# Patient Record
Sex: FEMALE | Race: BLACK OR AFRICAN AMERICAN | NOT HISPANIC OR LATINO | Employment: FULL TIME | URBAN - METROPOLITAN AREA
[De-identification: names, ages, dates, MRNs, and addresses within clinical notes are randomized per-mention and may not be internally consistent; named-entity substitution may affect disease eponyms.]

---

## 2018-10-05 ENCOUNTER — TELEPHONE (OUTPATIENT)
Dept: INTERNAL MEDICINE | Facility: CLINIC | Age: 63
End: 2018-10-05

## 2018-10-05 NOTE — TELEPHONE ENCOUNTER
----- Message from Zeina Craig sent at 10/5/2018  3:05 PM CDT -----  Contact: [phone]  Patient requesting an call to discuss setting  an new patient appointment in the month of November , stated she just left MD office . Please call and advise, Thanks

## 2018-11-15 ENCOUNTER — IMMUNIZATION (OUTPATIENT)
Dept: INTERNAL MEDICINE | Facility: CLINIC | Age: 63
End: 2018-11-15
Payer: MEDICARE

## 2018-11-15 ENCOUNTER — OFFICE VISIT (OUTPATIENT)
Dept: INTERNAL MEDICINE | Facility: CLINIC | Age: 63
End: 2018-11-15
Payer: MEDICARE

## 2018-11-15 VITALS
DIASTOLIC BLOOD PRESSURE: 91 MMHG | SYSTOLIC BLOOD PRESSURE: 139 MMHG | HEART RATE: 70 BPM | OXYGEN SATURATION: 99 % | HEIGHT: 64 IN | WEIGHT: 205 LBS | BODY MASS INDEX: 35 KG/M2

## 2018-11-15 DIAGNOSIS — D24.1 BENIGN NEOPLASM OF RIGHT BREAST: ICD-10-CM

## 2018-11-15 DIAGNOSIS — Z12.31 SCREENING MAMMOGRAM, ENCOUNTER FOR: ICD-10-CM

## 2018-11-15 DIAGNOSIS — I10 ESSENTIAL HYPERTENSION: Primary | ICD-10-CM

## 2018-11-15 DIAGNOSIS — R51.9 NONINTRACTABLE HEADACHE, UNSPECIFIED CHRONICITY PATTERN, UNSPECIFIED HEADACHE TYPE: ICD-10-CM

## 2018-11-15 DIAGNOSIS — E78.5 HYPERLIPIDEMIA, UNSPECIFIED HYPERLIPIDEMIA TYPE: ICD-10-CM

## 2018-11-15 DIAGNOSIS — Z20.2 SYPHILIS CONTACT, TREATED: ICD-10-CM

## 2018-11-15 DIAGNOSIS — N63.0 BREAST MASS: ICD-10-CM

## 2018-11-15 DIAGNOSIS — E55.9 VITAMIN D DEFICIENCY: ICD-10-CM

## 2018-11-15 DIAGNOSIS — R35.0 URINARY FREQUENCY: ICD-10-CM

## 2018-11-15 PROCEDURE — 3008F BODY MASS INDEX DOCD: CPT | Mod: CPTII,S$GLB,, | Performed by: INTERNAL MEDICINE

## 2018-11-15 PROCEDURE — 1101F PT FALLS ASSESS-DOCD LE1/YR: CPT | Mod: CPTII,S$GLB,, | Performed by: INTERNAL MEDICINE

## 2018-11-15 PROCEDURE — 82043 UR ALBUMIN QUANTITATIVE: CPT

## 2018-11-15 PROCEDURE — 90662 IIV NO PRSV INCREASED AG IM: CPT | Mod: PBBFAC,PO

## 2018-11-15 PROCEDURE — 99214 OFFICE O/P EST MOD 30 MIN: CPT | Mod: S$GLB,,, | Performed by: INTERNAL MEDICINE

## 2018-11-15 PROCEDURE — 81001 URINALYSIS AUTO W/SCOPE: CPT

## 2018-11-15 PROCEDURE — 99499 UNLISTED E&M SERVICE: CPT | Mod: S$GLB,,, | Performed by: INTERNAL MEDICINE

## 2018-11-15 PROCEDURE — 99999 PR PBB SHADOW E&M-EST. PATIENT-LVL V: CPT | Mod: PBBFAC,,, | Performed by: INTERNAL MEDICINE

## 2018-11-15 RX ORDER — METFORMIN HYDROCHLORIDE 1000 MG/1
TABLET ORAL
Refills: 4 | COMMUNITY
Start: 2018-09-04 | End: 2018-12-13 | Stop reason: SDUPTHER

## 2018-11-15 RX ORDER — INSULIN GLARGINE 300 U/ML
INJECTION, SOLUTION SUBCUTANEOUS
Qty: 5 SYRINGE | Refills: 3 | Status: ON HOLD | OUTPATIENT
Start: 2018-11-15 | End: 2023-06-05 | Stop reason: CLARIF

## 2018-11-15 RX ORDER — GABAPENTIN 300 MG/1
CAPSULE ORAL
Qty: 90 CAPSULE | Refills: 6 | Status: SHIPPED | OUTPATIENT
Start: 2018-11-15 | End: 2019-10-22 | Stop reason: SDUPTHER

## 2018-11-15 RX ORDER — PRAVASTATIN SODIUM 40 MG/1
TABLET ORAL
Refills: 3 | COMMUNITY
Start: 2018-09-04 | End: 2018-12-13 | Stop reason: SDUPTHER

## 2018-11-15 RX ORDER — LEVOTHYROXINE SODIUM 100 UG/1
TABLET ORAL
Refills: 3 | COMMUNITY
Start: 2018-09-04 | End: 2018-11-15

## 2018-11-15 RX ORDER — LEVOTHYROXINE SODIUM 125 UG/1
TABLET ORAL
Qty: 90 TABLET | Refills: 3 | Status: SHIPPED | OUTPATIENT
Start: 2018-11-15 | End: 2019-03-01

## 2018-11-15 RX ORDER — LEVOTHYROXINE SODIUM 125 UG/1
TABLET ORAL
Refills: 5 | COMMUNITY
Start: 2018-09-11 | End: 2018-11-15 | Stop reason: SDUPTHER

## 2018-11-15 RX ORDER — INSULIN GLARGINE 300 U/ML
INJECTION, SOLUTION SUBCUTANEOUS
Refills: 3 | COMMUNITY
Start: 2018-09-11 | End: 2018-11-15 | Stop reason: SDUPTHER

## 2018-11-15 RX ORDER — HYDROCHLOROTHIAZIDE 25 MG/1
TABLET ORAL
Refills: 0 | COMMUNITY
Start: 2018-09-04 | End: 2018-12-13 | Stop reason: SDUPTHER

## 2018-11-15 RX ORDER — NAPROXEN SODIUM 220 MG/1
TABLET, FILM COATED ORAL
Refills: 2 | COMMUNITY
Start: 2018-09-04 | End: 2018-12-13 | Stop reason: SDUPTHER

## 2018-11-15 RX ORDER — AMLODIPINE BESYLATE 10 MG/1
TABLET ORAL
Refills: 3 | COMMUNITY
Start: 2018-09-04 | End: 2018-12-13 | Stop reason: SDUPTHER

## 2018-11-15 RX ORDER — INSULIN PUMP SYRINGE, 3 ML
EACH MISCELLANEOUS
Qty: 1 EACH | Refills: 0 | Status: SHIPPED | OUTPATIENT
Start: 2018-11-15 | End: 2018-12-03 | Stop reason: SDUPTHER

## 2018-11-16 LAB
ALBUMIN/CREAT UR: 76.6 UG/MG
BACTERIA #/AREA URNS AUTO: NORMAL /HPF
BILIRUB UR QL STRIP: NEGATIVE
CLARITY UR REFRACT.AUTO: CLEAR
COLOR UR AUTO: YELLOW
CREAT UR-MCNC: 145 MG/DL
GLUCOSE UR QL STRIP: ABNORMAL
HGB UR QL STRIP: ABNORMAL
HYALINE CASTS UR QL AUTO: 1 /LPF
KETONES UR QL STRIP: NEGATIVE
LEUKOCYTE ESTERASE UR QL STRIP: NEGATIVE
MICROALBUMIN UR DL<=1MG/L-MCNC: 111 UG/ML
MICROSCOPIC COMMENT: NORMAL
NITRITE UR QL STRIP: NEGATIVE
PH UR STRIP: 5 [PH] (ref 5–8)
PROT UR QL STRIP: NEGATIVE
RBC #/AREA URNS AUTO: 2 /HPF (ref 0–4)
SP GR UR STRIP: 1.03 (ref 1–1.03)
URN SPEC COLLECT METH UR: ABNORMAL
WBC #/AREA URNS AUTO: 0 /HPF (ref 0–5)
YEAST UR QL AUTO: NORMAL

## 2018-11-17 ENCOUNTER — HOSPITAL ENCOUNTER (OUTPATIENT)
Dept: RADIOLOGY | Facility: HOSPITAL | Age: 63
Discharge: HOME | End: 2018-11-17
Attending: INTERNAL MEDICINE
Payer: MEDICARE

## 2018-11-17 DIAGNOSIS — R51.9 NONINTRACTABLE HEADACHE, UNSPECIFIED CHRONICITY PATTERN, UNSPECIFIED HEADACHE TYPE: ICD-10-CM

## 2018-11-17 PROCEDURE — 70450 CT HEAD/BRAIN W/O DYE: CPT | Mod: 26,,, | Performed by: RADIOLOGY

## 2018-11-17 PROCEDURE — 70450 CT HEAD/BRAIN W/O DYE: CPT | Mod: TC

## 2018-11-19 NOTE — PROGRESS NOTES
Subjective:       Patient ID: Marco Hagen is a 63 y.o. female.    Chief Complaint: Establish Care    HPIPt is new to me with HTN, DM, HLP.  SHe is having some HA.  Denies any CP or SOB.  Has not been taking her insulin - blood sugar today 298.  Review of Systems   Respiratory: Negative for shortness of breath (PND or orthopnea).    Cardiovascular: Negative for chest pain (arm pain or jaw pain).   Gastrointestinal: Negative for abdominal pain, diarrhea, nausea and vomiting.   Genitourinary: Negative for dysuria.   Neurological: Positive for headaches. Negative for seizures and syncope.       Objective:      Physical Exam   Constitutional: She is oriented to person, place, and time. She appears well-developed and well-nourished. No distress.   HENT:   Head: Normocephalic.   Mouth/Throat: Oropharynx is clear and moist.   Eyes: EOM are normal. Pupils are equal, round, and reactive to light.   Neck: Neck supple. No JVD present. No thyromegaly present.   Cardiovascular: Normal rate, regular rhythm, normal heart sounds and intact distal pulses. Exam reveals no gallop and no friction rub.   No murmur heard.  Pulmonary/Chest: Effort normal and breath sounds normal. She has no wheezes. She has no rales.   R breast - approx 1cm slightly tender breast mass at 6 o'clock   Abdominal: Soft. Bowel sounds are normal. She exhibits no distension and no mass. There is no tenderness. There is no rebound and no guarding.   Musculoskeletal: She exhibits no edema.   Lymphadenopathy:     She has no cervical adenopathy.   Neurological: She is alert and oriented to person, place, and time. She has normal reflexes.   Skin: Skin is warm and dry.   Psychiatric: She has a normal mood and affect. Her behavior is normal. Judgment and thought content normal.       Assessment:       1. Essential hypertension    2. Hyperlipidemia, unspecified hyperlipidemia type    3. Nonintractable headache, unspecified chronicity pattern, unspecified headache type     4. Diabetes mellitus with neurological manifestations, uncontrolled    5. Vitamin D deficiency    6. Screening mammogram, encounter for    7. Urinary frequency    8. Syphilis contact, treated    9. Breast mass    10. Benign neoplasm of right breast         Plan:   Essential hypertension  -     CBC auto differential; Future; Expected date: 11/15/2018  -     Comprehensive metabolic panel; Future; Expected date: 11/15/2018  -     TSH; Future; Expected date: 11/15/2018  Questionable control - check lab  Hyperlipidemia, unspecified hyperlipidemia type  -     Lipid panel; Future; Expected date: 11/15/2018    Nonintractable headache, unspecified chronicity pattern, unspecified headache type  -     CT Head Without Contrast; Future; Expected date: 11/15/2018    Diabetes mellitus with neurological manifestations, uncontrolled  -     Hemoglobin A1c; Future; Expected date: 11/15/2018  -     Urinalysis  -     Microalbumin/creatinine urine ratio  -     Ambulatory consult to Optometry    Vitamin D deficiency  -     Vitamin D; Future; Expected date: 11/15/2018    Urinary frequency  -     Urine culture    Syphilis contact, treated  -     RPR; Future; Expected date: 11/15/2018 -   with tertiary syphilis    Breast mass  -     Mammo Digital Diagnostic Bilat w/ Scott; Future; Expected date: 11/15/2018  -     US Breast Right Limited; Future; Expected date: 11/15/2018    Benign neoplasm of right breast   -     Mammo Digital Diagnostic Bilat w/ Scott; Future; Expected date: 11/15/2018    Other orders  -     blood-glucose meter kit; Check glucose daily E11.9 meter covered by insurance, check glucose twice daily  Dispense: 1 each; Refill: 0  -     blood sugar diagnostic Strp; Strips and lancets covered by insurance E11.9, check glucose twice daily  Dispense: 100 each; Refill: 6  -     gabapentin (NEURONTIN) 300 MG capsule; Three at bedtime  Dispense: 90 capsule; Refill: 6  -     levothyroxine (SYNTHROID) 125 MCG tablet; TK 1 T PO  IN THE MORNING OES AT LEAST 30 MIN TO AN HOUR B EATING FOOD  Dispense: 90 tablet; Refill: 3  -     TOUJEO SOLOSTAR U-300 INSULIN 300 unit/mL (1.5 mL) InPn pen; INJ 15 UNITS SC    D  Dispense: 5 Syringe; Refill: 3  -     Urinalysis Microscopic  Get records.

## 2018-11-23 ENCOUNTER — HOSPITAL ENCOUNTER (OUTPATIENT)
Dept: RADIOLOGY | Facility: HOSPITAL | Age: 63
Discharge: HOME | End: 2018-11-23
Attending: INTERNAL MEDICINE
Payer: MEDICARE

## 2018-11-23 ENCOUNTER — TELEPHONE (OUTPATIENT)
Dept: INTERNAL MEDICINE | Facility: CLINIC | Age: 63
End: 2018-11-23

## 2018-11-23 VITALS — HEIGHT: 64 IN | WEIGHT: 205 LBS | BODY MASS INDEX: 35 KG/M2

## 2018-11-23 DIAGNOSIS — N63.0 BREAST MASS: ICD-10-CM

## 2018-11-23 DIAGNOSIS — D24.1 BENIGN NEOPLASM OF RIGHT BREAST: ICD-10-CM

## 2018-11-23 PROCEDURE — 76642 ULTRASOUND BREAST LIMITED: CPT | Mod: 26,RT,, | Performed by: RADIOLOGY

## 2018-11-23 PROCEDURE — 77066 DX MAMMO INCL CAD BI: CPT | Mod: 26,,, | Performed by: RADIOLOGY

## 2018-11-23 PROCEDURE — 77062 BREAST TOMOSYNTHESIS BI: CPT | Mod: 26,,, | Performed by: RADIOLOGY

## 2018-11-23 PROCEDURE — 76642 ULTRASOUND BREAST LIMITED: CPT | Mod: TC,PO,RT

## 2018-11-23 PROCEDURE — 77066 DX MAMMO INCL CAD BI: CPT | Mod: TC,PO

## 2018-11-23 NOTE — TELEPHONE ENCOUNTER
Spoke with pharmacy([facility]), will fax script for supplies----- Message from Rebeca Montero sent at 11/21/2018  5:05 PM CST -----  Contact: [facility] [place] [phone]      ----- Message -----  From: Julisa Willingham  Sent: 11/21/2018   3:51 PM  To: Kareem Senior is requesting a prescription for patients diabetic supplies to be faxed to [facility] instead of  pharmacy.    [phone]

## 2018-11-23 NOTE — TELEPHONE ENCOUNTER
Pt has prescription for new meter sent to [facility] please resent to [facility] on file

## 2018-11-29 NOTE — PROGRESS NOTES
Patient, Marco Hagen (MRN #[id number]), presented with a recorded BMI of 35.19 kg/m^2 and a documented comorbidity(s):  - Hypertension  - Hyperlipidemia  to which the severe obesity is a contributing factor. This is consistent with the definition of severe obesity (BMI 35.0-39.9) with comorbidity (ICD-10 E66.01, Z68.35). The patient's severe obesity was monitored, evaluated, addressed and/or treated. This addendum to the medical record is made on 11/29/2018.

## 2018-12-03 RX ORDER — INSULIN PUMP SYRINGE, 3 ML
EACH MISCELLANEOUS
Qty: 1 EACH | Refills: 0 | Status: SHIPPED | OUTPATIENT
Start: 2018-12-03

## 2018-12-03 RX ORDER — LANCETS
EACH MISCELLANEOUS
Qty: 100 EACH | Refills: 6 | Status: SHIPPED | OUTPATIENT
Start: 2018-12-03 | End: 2019-03-12 | Stop reason: SDUPTHER

## 2018-12-03 NOTE — TELEPHONE ENCOUNTER
----- Message from Cecy Rothman sent at 12/3/2018  1:18 PM CST -----  Contact: Raheel[employer]/[phone]  Diabetic or Medical Supplies.  What supplies are needed: lancets, strips, glucometer   What is the brand name of the supplies: true Metrix   Is this a refill or new prescription:  New  Who prescribed the supplies:    Pharmacy or company name, phone # and location:  Script will be sent to [facility]  Comments:  Clinical Notes can be faxed to  [phone]

## 2018-12-10 NOTE — TELEPHONE ENCOUNTER
"----- Message from Amanda Wilder sent at 12/10/2018  3:17 PM CST -----  Contact: self/[phone]  RX request - refill or new RX.  Is this a refill or new RX:    RX name and strength: metrix test stripts  Directions:   Is this a 30 day or 90 day RX:    Local pharmacy or mail order pharmacy:  Local pharmacy  Pharmacy name and phone # (DON'T enter "on file" or "in chart"): [facility] [address] [place] [address] SARABJIT RIZVI AT [facility] KAYA RIZVI [phone] (Phone)  [phone] (Fax)      Comments:          "

## 2018-12-10 NOTE — TELEPHONE ENCOUNTER
----- Message from Ruby Soto sent at 12/10/2018  4:38 PM CST -----  Contact: Patient [phone]  Patient is calling about a refill for test strips. Order was placed to go to [facility] but the Rx was printed instead. [facility] [address] [place] [address] SARABJIT RIZVI AT [facility] MORE WH[phone] (Phone  [phone] (Fax)      Patient is requesting a call back    Please call and advise  Thank you

## 2018-12-13 ENCOUNTER — LAB VISIT (OUTPATIENT)
Dept: LAB | Facility: HOSPITAL | Age: 63
End: 2018-12-13
Attending: INTERNAL MEDICINE
Payer: MEDICARE

## 2018-12-13 ENCOUNTER — OFFICE VISIT (OUTPATIENT)
Dept: INTERNAL MEDICINE | Facility: CLINIC | Age: 63
End: 2018-12-13
Payer: MEDICARE

## 2018-12-13 VITALS
WEIGHT: 205 LBS | HEART RATE: 81 BPM | OXYGEN SATURATION: 97 % | BODY MASS INDEX: 35 KG/M2 | SYSTOLIC BLOOD PRESSURE: 134 MMHG | HEIGHT: 64 IN | TEMPERATURE: 98 F | DIASTOLIC BLOOD PRESSURE: 84 MMHG

## 2018-12-13 DIAGNOSIS — E03.9 HYPOTHYROIDISM, UNSPECIFIED TYPE: ICD-10-CM

## 2018-12-13 DIAGNOSIS — I10 ESSENTIAL HYPERTENSION: Primary | ICD-10-CM

## 2018-12-13 DIAGNOSIS — M89.9 DISORDER OF BONE: ICD-10-CM

## 2018-12-13 DIAGNOSIS — E83.52 HYPERCALCEMIA: ICD-10-CM

## 2018-12-13 DIAGNOSIS — I10 ESSENTIAL HYPERTENSION: ICD-10-CM

## 2018-12-13 LAB
ALBUMIN SERPL BCP-MCNC: 4.1 G/DL
ALP SERPL-CCNC: 94 U/L
ALT SERPL W/O P-5'-P-CCNC: 22 U/L
ANION GAP SERPL CALC-SCNC: 10 MMOL/L
AST SERPL-CCNC: 25 U/L
BILIRUB SERPL-MCNC: 0.4 MG/DL
BUN SERPL-MCNC: 25 MG/DL
CALCIUM SERPL-MCNC: 11.7 MG/DL
CHLORIDE SERPL-SCNC: 102 MMOL/L
CO2 SERPL-SCNC: 30 MMOL/L
CREAT SERPL-MCNC: 1.4 MG/DL
EST. GFR  (AFRICAN AMERICAN): 45.5 ML/MIN/1.73 M^2
EST. GFR  (NON AFRICAN AMERICAN): 39.5 ML/MIN/1.73 M^2
GLUCOSE SERPL-MCNC: 126 MG/DL
POTASSIUM SERPL-SCNC: 4 MMOL/L
PROT SERPL-MCNC: 8.6 G/DL
PTH-INTACT SERPL-MCNC: 105 PG/ML
SODIUM SERPL-SCNC: 142 MMOL/L
T4 FREE SERPL-MCNC: 1.03 NG/DL
TSH SERPL DL<=0.005 MIU/L-ACNC: 20.17 UIU/ML

## 2018-12-13 PROCEDURE — 99214 OFFICE O/P EST MOD 30 MIN: CPT | Mod: S$GLB,,, | Performed by: INTERNAL MEDICINE

## 2018-12-13 PROCEDURE — 1101F PT FALLS ASSESS-DOCD LE1/YR: CPT | Mod: CPTII,S$GLB,, | Performed by: INTERNAL MEDICINE

## 2018-12-13 PROCEDURE — 84443 ASSAY THYROID STIM HORMONE: CPT

## 2018-12-13 PROCEDURE — 93005 ELECTROCARDIOGRAM TRACING: CPT | Mod: S$GLB,,, | Performed by: INTERNAL MEDICINE

## 2018-12-13 PROCEDURE — 99999 PR PBB SHADOW E&M-EST. PATIENT-LVL IV: CPT | Mod: PBBFAC,,, | Performed by: INTERNAL MEDICINE

## 2018-12-13 PROCEDURE — 93000 ELECTROCARDIOGRAM COMPLETE: CPT | Mod: S$GLB,,, | Performed by: INTERNAL MEDICINE

## 2018-12-13 PROCEDURE — G0009 ADMIN PNEUMOCOCCAL VACCINE: HCPCS | Mod: S$GLB,,, | Performed by: INTERNAL MEDICINE

## 2018-12-13 PROCEDURE — 3008F BODY MASS INDEX DOCD: CPT | Mod: CPTII,S$GLB,, | Performed by: INTERNAL MEDICINE

## 2018-12-13 PROCEDURE — 90670 PCV13 VACCINE IM: CPT | Mod: S$GLB,,, | Performed by: INTERNAL MEDICINE

## 2018-12-13 PROCEDURE — 84439 ASSAY OF FREE THYROXINE: CPT

## 2018-12-13 PROCEDURE — 83970 ASSAY OF PARATHORMONE: CPT

## 2018-12-13 PROCEDURE — 80053 COMPREHEN METABOLIC PANEL: CPT

## 2018-12-13 PROCEDURE — 36415 COLL VENOUS BLD VENIPUNCTURE: CPT | Mod: PO

## 2018-12-13 RX ORDER — NAPROXEN SODIUM 220 MG/1
TABLET, FILM COATED ORAL
Qty: 90 TABLET | Refills: 2 | Status: SHIPPED | OUTPATIENT
Start: 2018-12-13

## 2018-12-13 RX ORDER — METFORMIN HYDROCHLORIDE 1000 MG/1
1000 TABLET ORAL
Qty: 90 TABLET | Refills: 3 | Status: SHIPPED | OUTPATIENT
Start: 2018-12-13 | End: 2020-03-10

## 2018-12-13 RX ORDER — PRAVASTATIN SODIUM 40 MG/1
TABLET ORAL
Qty: 90 TABLET | Refills: 3 | Status: SHIPPED | OUTPATIENT
Start: 2018-12-13 | End: 2020-01-26

## 2018-12-13 RX ORDER — AMLODIPINE BESYLATE 10 MG/1
TABLET ORAL
Qty: 90 TABLET | Refills: 3 | Status: SHIPPED | OUTPATIENT
Start: 2018-12-13 | End: 2020-01-26

## 2018-12-13 RX ORDER — HYDROCHLOROTHIAZIDE 25 MG/1
TABLET ORAL
Qty: 90 TABLET | Refills: 3 | Status: SHIPPED | OUTPATIENT
Start: 2018-12-13 | End: 2020-01-26

## 2018-12-24 NOTE — PROGRESS NOTES
Subjective:       Patient ID: Marco Hagen is a 63 y.o. female.    Chief Complaint: Follow-up    HPIWe reviewed her studies from last time.  BP slightly better - she is back taking her insulin.  Discussed hyper calcemia.  Review of Systems   Respiratory: Negative for shortness of breath (PND or orthopnea).    Cardiovascular: Negative for chest pain (arm pain or jaw pain).   Gastrointestinal: Negative for abdominal pain, diarrhea, nausea and vomiting.   Genitourinary: Negative for dysuria.   Neurological: Negative for seizures, syncope and headaches.       Objective:      Physical Exam   Constitutional: She is oriented to person, place, and time. She appears well-developed and well-nourished. No distress.   HENT:   Head: Normocephalic.   Mouth/Throat: Oropharynx is clear and moist.   Neck: Neck supple. No JVD present. No thyromegaly present.   Cardiovascular: Normal rate, regular rhythm, normal heart sounds and intact distal pulses. Exam reveals no gallop and no friction rub.   No murmur heard.  Pulmonary/Chest: Effort normal and breath sounds normal. She has no wheezes. She has no rales.   Abdominal: Soft. Bowel sounds are normal. She exhibits no distension and no mass. There is no tenderness. There is no rebound and no guarding.   Musculoskeletal: She exhibits no edema.   Lymphadenopathy:     She has no cervical adenopathy.   Neurological: She is alert and oriented to person, place, and time. She has normal reflexes.   Skin: Skin is warm and dry.   Psychiatric: She has a normal mood and affect. Her behavior is normal. Judgment and thought content normal.       Assessment:       1. Essential hypertension    2. Hypothyroidism, unspecified type    3. Hypercalcemia    4. Disorder of bone        Plan:   Essential hypertension  -     Comprehensive metabolic panel; Future; Expected date: 12/13/2018  -     EKG 12-lead    Hypothyroidism, unspecified type  -     TSH; Future; Expected date: 12/13/2018    Hypercalcemia  -      PTH, intact; Future; Expected date: 12/13/2018    Disorder of bone  -     DXA Bone Density Spine And Hip; Future; Expected date: 12/13/2018    Other orders  -     (In Office Administered) Pneumococcal Conjugate Vaccine (13 Valent) (IM)  -     pravastatin (PRAVACHOL) 40 MG tablet; TK 1 T PO  QD  Dispense: 90 tablet; Refill: 3  -     metFORMIN (GLUCOPHAGE) 1000 MG tablet; Take 1 tablet (1,000 mg total) by mouth daily with breakfast.  Dispense: 90 tablet; Refill: 3  -     hydroCHLOROthiazide (HYDRODIURIL) 25 MG tablet; TK 1 T PO  QD  Dispense: 90 tablet; Refill: 3  -     aspirin 81 MG Chew; One daily  Dispense: 90 tablet; Refill: 2  -     amLODIPine (NORVASC) 10 MG tablet; TK 1 T PO  QD  Dispense: 90 tablet; Refill: 3

## 2019-01-02 ENCOUNTER — HOSPITAL ENCOUNTER (OUTPATIENT)
Dept: RADIOLOGY | Facility: CLINIC | Age: 64
Discharge: HOME | End: 2019-01-02
Attending: INTERNAL MEDICINE
Payer: MEDICARE

## 2019-01-02 DIAGNOSIS — M89.9 DISORDER OF BONE: ICD-10-CM

## 2019-01-02 PROCEDURE — 77080 DXA BONE DENSITY AXIAL: CPT | Mod: 26,,, | Performed by: INTERNAL MEDICINE

## 2019-01-02 PROCEDURE — 77080 DXA BONE DENSITY AXIAL: CPT | Mod: TC

## 2019-01-02 PROCEDURE — 77080 DEXA BONE DENSITY SPINE HIP: ICD-10-PCS | Mod: 26,,, | Performed by: INTERNAL MEDICINE

## 2019-02-06 ENCOUNTER — TELEPHONE (OUTPATIENT)
Dept: INTERNAL MEDICINE | Facility: CLINIC | Age: 64
End: 2019-02-06

## 2019-02-06 NOTE — TELEPHONE ENCOUNTER
----- Message from Myla Michaud sent at 2/6/2019 12:56 PM CST -----  Contact: [facility] [phone]  Prior Authorization Needed    Medication: TOUJEO SOLOSTAR U-300 INSULIN 300 unit/mL (1.5 mL) InPn pen    Pharmacy Info: [facility] [address] [place] [address] SARABJIT RIZVI AT [facility] OF KAYA RIZVI    Plan does not cover this medication. Please call plan at [phone] to initiate prior authorization or call/fax pharmacy to change medication. Patient ID#[id number]    Note chart when prior authorization has been submitted.    Please notify pharmacy when prior authorization has been approved.    Thank You

## 2019-02-11 ENCOUNTER — TELEPHONE (OUTPATIENT)
Dept: INTERNAL MEDICINE | Facility: CLINIC | Age: 64
End: 2019-02-11

## 2019-02-11 NOTE — TELEPHONE ENCOUNTER
----- Message from Myla Michaud sent at 2/11/2019 11:07 AM CST -----  Contact: [facility] [phone]  2nd Request    Prior Authorization Needed     Medication: TOUJEO SOLOSTAR U-300 INSULIN 300 unit/mL (1.5 mL) In pen     Pharmacy Info: [facility] [address] [place] [address] SARABJIT RIZVI AT [facility] KAYA RIZVI     Plan does not cover this medication. Please call plan at [phone] to initiate prior authorization or call/fax pharmacy to change medication. Patient ID#[id number]     Note chart when prior authorization has been submitted.     Please notify pharmacy when prior authorization has been approved.     Thank You

## 2019-02-12 NOTE — TELEPHONE ENCOUNTER
Spoke with the patient's insurance to see which insulin is covered. Toujeo solostar isn't covered by the insurance. The insulins that are covered by patient's insurance are    Tresiba, levemir and Basaglar    Please advise

## 2019-02-13 DIAGNOSIS — E11.9 TYPE 2 DIABETES MELLITUS WITHOUT COMPLICATION, UNSPECIFIED WHETHER LONG TERM INSULIN USE: ICD-10-CM

## 2019-03-01 ENCOUNTER — HOSPITAL ENCOUNTER (OUTPATIENT)
Dept: RADIOLOGY | Facility: HOSPITAL | Age: 64
Discharge: HOME | End: 2019-03-01
Attending: INTERNAL MEDICINE
Payer: MEDICARE

## 2019-03-01 ENCOUNTER — OFFICE VISIT (OUTPATIENT)
Dept: INTERNAL MEDICINE | Facility: CLINIC | Age: 64
End: 2019-03-01
Payer: MEDICARE

## 2019-03-01 VITALS
HEART RATE: 69 BPM | OXYGEN SATURATION: 99 % | DIASTOLIC BLOOD PRESSURE: 92 MMHG | HEIGHT: 64 IN | TEMPERATURE: 98 F | WEIGHT: 207.25 LBS | BODY MASS INDEX: 35.38 KG/M2 | SYSTOLIC BLOOD PRESSURE: 142 MMHG

## 2019-03-01 DIAGNOSIS — R20.2 PARESTHESIA: ICD-10-CM

## 2019-03-01 DIAGNOSIS — M79.644 FINGER PAIN, RIGHT: ICD-10-CM

## 2019-03-01 DIAGNOSIS — E03.9 HYPOTHYROIDISM, UNSPECIFIED TYPE: ICD-10-CM

## 2019-03-01 DIAGNOSIS — E11.9 DIABETES MELLITUS WITH COINCIDENT HYPERTENSION: ICD-10-CM

## 2019-03-01 DIAGNOSIS — I10 DIABETES MELLITUS WITH COINCIDENT HYPERTENSION: ICD-10-CM

## 2019-03-01 DIAGNOSIS — E21.3 HYPERPARATHYROIDISM: Primary | ICD-10-CM

## 2019-03-01 PROCEDURE — 99999 PR PBB SHADOW E&M-EST. PATIENT-LVL IV: CPT | Mod: PBBFAC,,, | Performed by: INTERNAL MEDICINE

## 2019-03-01 PROCEDURE — 3077F SYST BP >= 140 MM HG: CPT | Mod: CPTII,S$GLB,, | Performed by: INTERNAL MEDICINE

## 2019-03-01 PROCEDURE — 1101F PT FALLS ASSESS-DOCD LE1/YR: CPT | Mod: CPTII,S$GLB,, | Performed by: INTERNAL MEDICINE

## 2019-03-01 PROCEDURE — 99499 RISK ADDL DX/OHS AUDIT: ICD-10-PCS | Mod: S$GLB,,, | Performed by: INTERNAL MEDICINE

## 2019-03-01 PROCEDURE — 99999 PR PBB SHADOW E&M-EST. PATIENT-LVL IV: ICD-10-PCS | Mod: PBBFAC,,, | Performed by: INTERNAL MEDICINE

## 2019-03-01 PROCEDURE — 3077F PR MOST RECENT SYSTOLIC BLOOD PRESSURE >= 140 MM HG: ICD-10-PCS | Mod: CPTII,S$GLB,, | Performed by: INTERNAL MEDICINE

## 2019-03-01 PROCEDURE — 99214 PR OFFICE/OUTPT VISIT, EST, LEVL IV, 30-39 MIN: ICD-10-PCS | Mod: S$GLB,,, | Performed by: INTERNAL MEDICINE

## 2019-03-01 PROCEDURE — 3045F PR MOST RECENT HEMOGLOBIN A1C LEVEL 7.0-9.0%: ICD-10-PCS | Mod: CPTII,S$GLB,, | Performed by: INTERNAL MEDICINE

## 2019-03-01 PROCEDURE — 73140 X-RAY EXAM OF FINGER(S): CPT | Mod: 26,LT,, | Performed by: RADIOLOGY

## 2019-03-01 PROCEDURE — 3080F PR MOST RECENT DIASTOLIC BLOOD PRESSURE >= 90 MM HG: ICD-10-PCS | Mod: CPTII,S$GLB,, | Performed by: INTERNAL MEDICINE

## 2019-03-01 PROCEDURE — 99499 UNLISTED E&M SERVICE: CPT | Mod: S$GLB,,, | Performed by: INTERNAL MEDICINE

## 2019-03-01 PROCEDURE — 99214 OFFICE O/P EST MOD 30 MIN: CPT | Mod: S$GLB,,, | Performed by: INTERNAL MEDICINE

## 2019-03-01 PROCEDURE — 3080F DIAST BP >= 90 MM HG: CPT | Mod: CPTII,S$GLB,, | Performed by: INTERNAL MEDICINE

## 2019-03-01 PROCEDURE — 73140 XR FINGER 2 OR MORE VIEWS LEFT: ICD-10-PCS | Mod: 26,LT,, | Performed by: RADIOLOGY

## 2019-03-01 PROCEDURE — 1101F PR PT FALLS ASSESS DOC 0-1 FALLS W/OUT INJ PAST YR: ICD-10-PCS | Mod: CPTII,S$GLB,, | Performed by: INTERNAL MEDICINE

## 2019-03-01 PROCEDURE — 3008F PR BODY MASS INDEX (BMI) DOCUMENTED: ICD-10-PCS | Mod: CPTII,S$GLB,, | Performed by: INTERNAL MEDICINE

## 2019-03-01 PROCEDURE — 3008F BODY MASS INDEX DOCD: CPT | Mod: CPTII,S$GLB,, | Performed by: INTERNAL MEDICINE

## 2019-03-01 PROCEDURE — 3045F PR MOST RECENT HEMOGLOBIN A1C LEVEL 7.0-9.0%: CPT | Mod: CPTII,S$GLB,, | Performed by: INTERNAL MEDICINE

## 2019-03-01 PROCEDURE — 73140 X-RAY EXAM OF FINGER(S): CPT | Mod: TC,FY,PO,LT

## 2019-03-01 RX ORDER — LEVOTHYROXINE SODIUM 150 UG/1
150 TABLET ORAL DAILY
Qty: 90 TABLET | Refills: 3 | Status: SHIPPED | OUTPATIENT
Start: 2019-03-01 | End: 2020-04-13 | Stop reason: SDUPTHER

## 2019-03-03 ENCOUNTER — TELEPHONE (OUTPATIENT)
Dept: INTERNAL MEDICINE | Facility: CLINIC | Age: 64
End: 2019-03-03

## 2019-03-03 DIAGNOSIS — L98.9 SKIN LESION: Primary | ICD-10-CM

## 2019-03-04 ENCOUNTER — TELEPHONE (OUTPATIENT)
Dept: INTERNAL MEDICINE | Facility: CLINIC | Age: 64
End: 2019-03-04

## 2019-03-04 NOTE — TELEPHONE ENCOUNTER
Left a message for the patient to call the office back.   To inform of hand  X-Ray results no bone issue, and confirm scheduled appointment in Dermatology       Please Advise  Thank You

## 2019-03-04 NOTE — TELEPHONE ENCOUNTER
----- Message from Selene Handley sent at 3/4/2019  2:35 PM CST -----  Contact: Self [phone]  Diabetic or Medical Supplies.  What supplies are needed: Needle  What is the brand name of the supplies: BD Ultra fine  Is this a refill or new prescription:  refill  Who prescribed the supplies:    Pharmacy or company name, phone # and location:  [facility] [address] [place] [address] SARABJIT RIZVI AT [facility] KAYA RIZVI [phone] (Phone)  [phone] (Fax)  Comments:  Pt is out of needles

## 2019-03-04 NOTE — PROGRESS NOTES
Subjective:       Patient ID: Marco Hagen is a 63 y.o. female.    Chief Complaint: Follow-up    HPI{t reports taking her meds and having a glucose of approx 120.  No CP or SOB.  She feels well.  Review of Systems   Respiratory: Negative for shortness of breath (PND or orthopnea).    Cardiovascular: Negative for chest pain (arm pain or jaw pain).   Gastrointestinal: Negative for abdominal pain, diarrhea, nausea and vomiting.   Genitourinary: Negative for dysuria.   Neurological: Negative for seizures, syncope and headaches.       Objective:      Physical Exam   Constitutional: She is oriented to person, place, and time. She appears well-developed and well-nourished. No distress.   HENT:   Head: Normocephalic.   Mouth/Throat: Oropharynx is clear and moist.   Neck: Neck supple. No JVD present. No thyromegaly present.   Cardiovascular: Normal rate, regular rhythm, normal heart sounds and intact distal pulses. Exam reveals no gallop and no friction rub.   No murmur heard.  Pulmonary/Chest: Effort normal and breath sounds normal. She has no wheezes. She has no rales.   Abdominal: Soft. Bowel sounds are normal. She exhibits no distension and no mass. There is no tenderness. There is no rebound and no guarding.   Musculoskeletal: She exhibits no edema.   Lymphadenopathy:     She has no cervical adenopathy.   Neurological: She is alert and oriented to person, place, and time. She has normal reflexes.   Skin: Skin is warm and dry.   Keloid type area over PIP joint of finger   Psychiatric: She has a normal mood and affect. Her behavior is normal. Judgment and thought content normal.       Assessment:       1. Hyperparathyroidism    2. Hypothyroidism, unspecified type    3. Diabetes mellitus with coincident hypertension    4. Paresthesia    5. Finger pain, right        Plan:   Hyperparathyroidism  -     NM Parathyroid Scan with SPECT Routine; Future; Expected date: 03/01/2019    Hypothyroidism, unspecified type  -     TSH;  Future; Expected date: 03/01/2019    Diabetes mellitus with coincident hypertension  -     Hemoglobin A1c; Future; Expected date: 03/01/2019    Paresthesia  -     EMG W/ ULTRASOUND AND NERVE CONDUCTION TEST 2 Extremities; Future    Finger pain, right  -     X-Ray Finger 2 or More Views Left; Future; Expected date: 03/01/2019    Other orders  -     levothyroxine (SYNTHROID) 150 MCG tablet; Take 1 tablet (150 mcg total) by mouth once daily.  Dispense: 90 tablet; Refill: 3

## 2019-03-12 ENCOUNTER — HOSPITAL ENCOUNTER (OUTPATIENT)
Dept: RADIOLOGY | Facility: HOSPITAL | Age: 64
Discharge: HOME | End: 2019-03-12
Attending: INTERNAL MEDICINE
Payer: MEDICARE

## 2019-03-12 DIAGNOSIS — E21.3 HYPERPARATHYROIDISM: ICD-10-CM

## 2019-03-12 PROCEDURE — 78071 PARATHYRD PLANAR W/WO SUBTRJ: CPT | Mod: 26,,, | Performed by: RADIOLOGY

## 2019-03-12 PROCEDURE — 78071 NM PARATHYROID SCAN WITH SPECT ROUTINE: ICD-10-PCS | Mod: 26,,, | Performed by: RADIOLOGY

## 2019-03-12 PROCEDURE — A9500 TC99M SESTAMIBI: HCPCS

## 2019-03-12 RX ORDER — LANCETS
EACH MISCELLANEOUS
Qty: 100 EACH | Refills: 6 | Status: SHIPPED | OUTPATIENT
Start: 2019-03-12

## 2019-03-12 RX ORDER — PEN NEEDLE, DIABETIC 31 GX5/16"
NEEDLE, DISPOSABLE MISCELLANEOUS
Qty: 100 EACH | Refills: 0 | Status: SHIPPED | OUTPATIENT
Start: 2019-03-12 | End: 2019-09-01 | Stop reason: SDUPTHER

## 2019-03-12 NOTE — TELEPHONE ENCOUNTER
----- Message from Charles Tracy sent at 3/12/2019  3:52 PM CDT -----  Contact: Sister [phone]   this a refill or new RX:  Refill    RX name and strength: lancets (ACCU-CHEK SOFTCLIX LANCETS) Misc  \  Pharmacy name and phone # [facility] [address] [address] SARABJIT RIZVI AT [facility] OF KAYA RIZVI [phone] (Phone)  [phone] (Fax)

## 2019-03-27 ENCOUNTER — TELEPHONE (OUTPATIENT)
Dept: INTERNAL MEDICINE | Facility: CLINIC | Age: 64
End: 2019-03-27

## 2019-03-27 NOTE — TELEPHONE ENCOUNTER
----- Message from Myla Michaud sent at 3/27/2019  1:08 PM CDT -----  Contact: [facility] [phone]  Prior Authorization Needed    Medication: TOUJEO SOLOSTAR U-300 INSULIN 300 unit/mL (1.5 mL) In pen    Pharmacy Info: [facility] [address] [place] [address] SARABJIT RIZVI AT [facility] KAYA RIZVI    Plan does not cover this medication. Please call plan at [phone] to initiate prior authorization or call/fax pharmacy to change medication. Patient ID#[id number]    Note chart when prior authorization has been submitted.    Please notify pharmacy when prior authorization has been approved.    Thank You

## 2019-03-29 ENCOUNTER — INITIAL CONSULT (OUTPATIENT)
Dept: DERMATOLOGY | Facility: CLINIC | Age: 64
End: 2019-03-29
Payer: MEDICARE

## 2019-03-29 DIAGNOSIS — D22.9 MULTIPLE BENIGN NEVI: ICD-10-CM

## 2019-03-29 DIAGNOSIS — L82.1 SEBORRHEIC KERATOSES: ICD-10-CM

## 2019-03-29 DIAGNOSIS — D23.9 DERMATOFIBROMA: ICD-10-CM

## 2019-03-29 DIAGNOSIS — Z12.83 SCREENING EXAM FOR SKIN CANCER: Primary | ICD-10-CM

## 2019-03-29 PROCEDURE — 99203 PR OFFICE/OUTPT VISIT, NEW, LEVL III, 30-44 MIN: ICD-10-PCS | Mod: S$GLB,,, | Performed by: DERMATOLOGY

## 2019-03-29 PROCEDURE — 99203 OFFICE O/P NEW LOW 30 MIN: CPT | Mod: S$GLB,,, | Performed by: DERMATOLOGY

## 2019-03-29 PROCEDURE — 99999 PR PBB SHADOW E&M-EST. PATIENT-LVL II: ICD-10-PCS | Mod: PBBFAC,,, | Performed by: DERMATOLOGY

## 2019-03-29 PROCEDURE — 1101F PT FALLS ASSESS-DOCD LE1/YR: CPT | Mod: CPTII,S$GLB,, | Performed by: DERMATOLOGY

## 2019-03-29 PROCEDURE — 99999 PR PBB SHADOW E&M-EST. PATIENT-LVL II: CPT | Mod: PBBFAC,,, | Performed by: DERMATOLOGY

## 2019-03-29 PROCEDURE — 1101F PR PT FALLS ASSESS DOC 0-1 FALLS W/OUT INJ PAST YR: ICD-10-PCS | Mod: CPTII,S$GLB,, | Performed by: DERMATOLOGY

## 2019-03-29 NOTE — LETTER
March 29, 2019      Valerie Serna MD  [address]  [facility] [address]           [facility]  [address]  [facility] [address]  Phone: [phone]  Fax: [phone]          Patient: Marco Hagen   MR Number: [id number]   YOB: 1955   Date of Visit: 3/29/2019       Dear Dr. Valerie Serna:    Thank you for referring Marco Hagen to me for evaluation. Attached you will find relevant portions of my assessment and plan of care.    If you have questions, please do not hesitate to call me. I look forward to following Marco Hagen along with you.    Sincerely,    Rach River MD    Enclosure  CC:  No Recipients    If you would like to receive this communication electronically, please contact [email or web address] or [phone] to request more information on [employer] Link access.    For providers and/or their staff who would like to refer a patient to [facility], please contact us through our one-stop-shop provider referral line, [facility], at [phone].    If you feel you have received this communication in error or would no longer like to receive these types of communications, please e-mail [email or web address]

## 2019-03-29 NOTE — PROGRESS NOTES
Subjective:       Patient ID:  Marco Hagen is a 63 y.o. female who presents for   Chief Complaint   Patient presents with    Skin Check     TBSE     HPI    64 yo female with no personal or family history of skin cancer, here for first TBSE. The patient denies any moles or growths of the skin that are rapidly growing, hurting, itching, bleeding, or changing colors. She notes a bump on the right elbow that sometimes hurts when she leans on it. Also has dry skin on elbows.    Review of Systems   Skin: Negative for daily sunscreen use, activity-related sunscreen use, recent sunburn and wears hat.   Hematologic/Lymphatic: Bruises/bleeds easily.        Objective:    Physical Exam   Constitutional: She appears well-developed and well-nourished. No distress.   Neurological: She is alert and oriented to person, place, and time. She is not disoriented.   Psychiatric: She has a normal mood and affect.   Skin:   Areas Examined (abnormalities noted in diagram):   Scalp / Hair Palpated and Inspected  Head / Face Inspection Performed  Neck Inspection Performed  Chest / Axilla Inspection Performed  Abdomen Inspection Performed  Genitals / Buttocks / Groin Inspection Performed  Back Inspection Performed  RUE Inspected  LUE Inspection Performed  RLE Inspected  LLE Inspection Performed  Nails and Digits Inspection Performed                   Diagram Legend     Erythematous scaling macule/papule c/w actinic keratosis       Vascular papule c/w angioma      Pigmented verrucoid papule/plaque c/w seborrheic keratosis      Yellow umbilicated papule c/w sebaceous hyperplasia      Irregularly shaped tan macule c/w lentigo     1-2 mm smooth white papules consistent with Milia      Movable subcutaneous cyst with punctum c/w epidermal inclusion cyst      Subcutaneous movable cyst c/w pilar cyst      Firm pink to brown papule c/w dermatofibroma      Pedunculated fleshy papule(s) c/w skin tag(s)      Evenly pigmented macule c/w junctional  nevus     Mildly variegated pigmented, slightly irregular-bordered macule c/w mildly atypical nevus      Flesh colored to evenly pigmented papule c/w intradermal nevus       Pink pearly papule/plaque c/w basal cell carcinoma      Erythematous hyperkeratotic cursted plaque c/w SCC      Surgical scar with no sign of skin cancer recurrence      Open and closed comedones      Inflammatory papules and pustules      Verrucoid papule consistent consistent with wart     Erythematous eczematous patches and plaques     Dystrophic onycholytic nail with subungual debris c/w onychomycosis     Umbilicated papule    Erythematous-base heme-crusted tan verrucoid plaque consistent with inflamed seborrheic keratosis     Erythematous Silvery Scaling Plaque c/w Psoriasis     See annotation      Assessment / Plan:        Screening exam for skin cancer      Total body skin examination performed today including at least 12 points as noted in physical examination. No lesions suspicious for malignancy noted.    Multiple benign nevi  Discussed ABCDE's of nevi.  Monitor for new mole or moles that are becoming bigger, darker, irritated, or developing irregular borders. Brochure provided.    Seborrheic keratoses  These are benign inherited growths without a malignant potential. Reassurance given to patient. No treatment is necessary.     Dermatofibroma  This is a benign scar-like lesion secondary to minor trauma. No treatment required.     Patient instructed in importance in daily sun protection of at least spf 30. Sun avoidance and topical protection discussed.                    Follow up if symptoms worsen or fail to improve.

## 2019-04-08 ENCOUNTER — TELEPHONE (OUTPATIENT)
Dept: INTERNAL MEDICINE | Facility: CLINIC | Age: 64
End: 2019-04-08

## 2019-04-08 NOTE — TELEPHONE ENCOUNTER
----- Message from David Gray sent at 4/8/2019  8:33 AM CDT -----  Contact: Patient [phone]  Patient is requesting for a call back regarding not being able to sleep at night due to stomach pain, would like a call back from office to discuss if pt. Is needing to be seen or next options?      Please call an advise  Thank you

## 2019-04-08 NOTE — TELEPHONE ENCOUNTER
Spoke with patient, stated she had some abdominal pains through out the night and this am, asked had she eaten or drink anything different, any new medications, how long has this been going on, she then stated, she has not been having any issues, she has not started any new medications, ate anything she doesn't usually aside from drinking a beer last night. She stated pain is no longer there, advised patient to give a call if symptoms re-occur. SHANI

## 2019-06-17 DIAGNOSIS — E11.9 TYPE 2 DIABETES MELLITUS WITHOUT COMPLICATION: ICD-10-CM

## 2019-09-01 RX ORDER — PEN NEEDLE, DIABETIC 31 GX5/16"
NEEDLE, DISPOSABLE MISCELLANEOUS
Qty: 100 EACH | Refills: 0 | Status: SHIPPED | OUTPATIENT
Start: 2019-09-01

## 2019-09-05 ENCOUNTER — TELEPHONE (OUTPATIENT)
Dept: INTERNAL MEDICINE | Facility: CLINIC | Age: 64
End: 2019-09-05

## 2019-10-23 RX ORDER — GABAPENTIN 300 MG/1
CAPSULE ORAL
Qty: 90 CAPSULE | Refills: 0 | Status: SHIPPED | OUTPATIENT
Start: 2019-10-23 | End: 2020-05-14

## 2019-11-22 DIAGNOSIS — E11.9 TYPE 2 DIABETES MELLITUS WITHOUT COMPLICATION: ICD-10-CM

## 2020-01-26 RX ORDER — PRAVASTATIN SODIUM 40 MG/1
TABLET ORAL
Qty: 90 TABLET | Refills: 3 | Status: ON HOLD | OUTPATIENT
Start: 2020-01-26 | End: 2023-06-05

## 2020-01-26 RX ORDER — HYDROCHLOROTHIAZIDE 25 MG/1
TABLET ORAL
Qty: 90 TABLET | Refills: 1 | Status: ON HOLD | OUTPATIENT
Start: 2020-01-26 | End: 2023-06-05

## 2020-01-26 RX ORDER — AMLODIPINE BESYLATE 10 MG/1
TABLET ORAL
Qty: 90 TABLET | Refills: 3 | Status: SHIPPED | OUTPATIENT
Start: 2020-01-26 | End: 2021-03-24 | Stop reason: SDUPTHER

## 2020-01-30 DIAGNOSIS — E11.9 TYPE 2 DIABETES MELLITUS WITHOUT COMPLICATION: ICD-10-CM

## 2020-03-10 ENCOUNTER — TELEPHONE (OUTPATIENT)
Dept: INTERNAL MEDICINE | Facility: CLINIC | Age: 65
End: 2020-03-10

## 2020-03-10 DIAGNOSIS — E55.9 MILD VITAMIN D DEFICIENCY: ICD-10-CM

## 2020-03-10 DIAGNOSIS — I10 DIABETES MELLITUS WITH COINCIDENT HYPERTENSION: ICD-10-CM

## 2020-03-10 DIAGNOSIS — E11.9 DIABETES MELLITUS WITH COINCIDENT HYPERTENSION: ICD-10-CM

## 2020-03-10 DIAGNOSIS — I10 ESSENTIAL HYPERTENSION: Primary | ICD-10-CM

## 2020-03-10 RX ORDER — METFORMIN HYDROCHLORIDE 1000 MG/1
TABLET ORAL
Qty: 90 TABLET | Refills: 3 | Status: ON HOLD | OUTPATIENT
Start: 2020-03-10 | End: 2023-06-05 | Stop reason: HOSPADM

## 2020-04-13 RX ORDER — LEVOTHYROXINE SODIUM 150 UG/1
150 TABLET ORAL DAILY
Qty: 90 TABLET | Refills: 3 | Status: SHIPPED | OUTPATIENT
Start: 2020-04-13 | End: 2023-06-05 | Stop reason: CLARIF

## 2020-04-13 RX ORDER — LEVOTHYROXINE SODIUM 125 UG/1
TABLET ORAL
Qty: 90 TABLET | Refills: 3 | OUTPATIENT
Start: 2020-04-13

## 2020-04-14 RX ORDER — LEVOTHYROXINE SODIUM 125 UG/1
TABLET ORAL
Qty: 90 TABLET | Refills: 3 | OUTPATIENT
Start: 2020-04-14

## 2020-05-14 RX ORDER — GABAPENTIN 300 MG/1
CAPSULE ORAL
Qty: 90 CAPSULE | Refills: 0 | Status: ON HOLD | OUTPATIENT
Start: 2020-05-14 | End: 2023-06-05

## 2020-09-24 ENCOUNTER — PATIENT OUTREACH (OUTPATIENT)
Dept: ADMINISTRATIVE | Facility: HOSPITAL | Age: 65
End: 2020-09-24

## 2021-03-26 RX ORDER — AMLODIPINE BESYLATE 10 MG/1
TABLET ORAL
Qty: 90 TABLET | Refills: 1 | Status: SHIPPED | OUTPATIENT
Start: 2021-03-26 | End: 2023-04-18

## 2023-06-04 ENCOUNTER — HOSPITAL ENCOUNTER (OUTPATIENT)
Facility: HOSPITAL | Age: 68
Discharge: HOME | End: 2023-06-05
Attending: EMERGENCY MEDICINE | Admitting: HOSPITALIST
Payer: MEDICARE

## 2023-06-04 DIAGNOSIS — I16.1 HYPERTENSIVE EMERGENCY: Primary | ICD-10-CM

## 2023-06-04 DIAGNOSIS — R07.9 CHEST PAIN: ICD-10-CM

## 2023-06-04 DIAGNOSIS — I10 HYPERTENSION: ICD-10-CM

## 2023-06-04 DIAGNOSIS — N17.9 AKI (ACUTE KIDNEY INJURY): ICD-10-CM

## 2023-06-04 DIAGNOSIS — R51.9 NONINTRACTABLE HEADACHE, UNSPECIFIED CHRONICITY PATTERN, UNSPECIFIED HEADACHE TYPE: ICD-10-CM

## 2023-06-04 PROBLEM — E11.42 DIABETIC PERIPHERAL NEUROPATHY: Status: ACTIVE | Noted: 2021-05-10

## 2023-06-04 PROBLEM — I12.9 HYPERTENSION ASSOCIATED WITH STAGE 4 CHRONIC KIDNEY DISEASE DUE TO TYPE 2 DIABETES MELLITUS: Status: ACTIVE | Noted: 2023-06-04

## 2023-06-04 PROBLEM — E11.22 HYPERTENSION ASSOCIATED WITH STAGE 4 CHRONIC KIDNEY DISEASE DUE TO TYPE 2 DIABETES MELLITUS: Status: ACTIVE | Noted: 2023-06-04

## 2023-06-04 PROBLEM — N18.4 HYPERTENSION ASSOCIATED WITH STAGE 4 CHRONIC KIDNEY DISEASE DUE TO TYPE 2 DIABETES MELLITUS: Status: ACTIVE | Noted: 2023-06-04

## 2023-06-04 PROBLEM — N18.9 ACUTE KIDNEY INJURY SUPERIMPOSED ON CHRONIC KIDNEY DISEASE: Status: ACTIVE | Noted: 2023-06-04

## 2023-06-04 PROBLEM — E21.0 PRIMARY HYPERPARATHYROIDISM: Status: ACTIVE | Noted: 2023-05-31

## 2023-06-04 PROBLEM — K59.00 CONSTIPATION: Status: ACTIVE | Noted: 2023-06-04

## 2023-06-04 PROBLEM — E78.5 DYSLIPIDEMIA: Status: ACTIVE | Noted: 2018-09-28

## 2023-06-04 PROBLEM — N18.4 CHRONIC RENAL DISEASE, STAGE IV: Status: ACTIVE | Noted: 2023-01-13

## 2023-06-04 LAB
ALBUMIN SERPL BCP-MCNC: 3.9 G/DL (ref 3.5–5.2)
ALP SERPL-CCNC: 95 U/L (ref 55–135)
ALT SERPL W/O P-5'-P-CCNC: 12 U/L (ref 10–44)
ANION GAP SERPL CALC-SCNC: 9 MMOL/L (ref 8–16)
AST SERPL-CCNC: 20 U/L (ref 10–40)
BACTERIA #/AREA URNS AUTO: NORMAL /HPF
BASOPHILS # BLD AUTO: 0.04 K/UL (ref 0–0.2)
BASOPHILS NFR BLD: 0.7 % (ref 0–1.9)
BILIRUB SERPL-MCNC: 0.5 MG/DL (ref 0.1–1)
BILIRUB UR QL STRIP: NEGATIVE
BUN SERPL-MCNC: 23 MG/DL (ref 8–23)
BUN SERPL-MCNC: 26 MG/DL (ref 6–30)
CALCIUM SERPL-MCNC: 10.7 MG/DL (ref 8.7–10.5)
CHLORIDE SERPL-SCNC: 108 MMOL/L (ref 95–110)
CHLORIDE SERPL-SCNC: 110 MMOL/L (ref 95–110)
CLARITY UR REFRACT.AUTO: CLEAR
CO2 SERPL-SCNC: 25 MMOL/L (ref 23–29)
COLOR UR AUTO: COLORLESS
CREAT SERPL-MCNC: 1.8 MG/DL (ref 0.5–1.4)
CREAT SERPL-MCNC: 1.8 MG/DL (ref 0.5–1.4)
DIFFERENTIAL METHOD: ABNORMAL
EOSINOPHIL # BLD AUTO: 0.2 K/UL (ref 0–0.5)
EOSINOPHIL NFR BLD: 4.1 % (ref 0–8)
ERYTHROCYTE [DISTWIDTH] IN BLOOD BY AUTOMATED COUNT: 12.9 % (ref 11.5–14.5)
EST. GFR  (NO RACE VARIABLE): 30.1 ML/MIN/1.73 M^2
GLUCOSE SERPL-MCNC: 119 MG/DL (ref 70–110)
GLUCOSE SERPL-MCNC: 120 MG/DL (ref 70–110)
GLUCOSE UR QL STRIP: ABNORMAL
HCT VFR BLD AUTO: 33.6 % (ref 37–48.5)
HCT VFR BLD CALC: 31 %PCV (ref 36–54)
HGB BLD-MCNC: 10.7 G/DL (ref 12–16)
HGB UR QL STRIP: ABNORMAL
IMM GRANULOCYTES # BLD AUTO: 0.01 K/UL (ref 0–0.04)
IMM GRANULOCYTES NFR BLD AUTO: 0.2 % (ref 0–0.5)
KETONES UR QL STRIP: NEGATIVE
LEUKOCYTE ESTERASE UR QL STRIP: NEGATIVE
LYMPHOCYTES # BLD AUTO: 1.4 K/UL (ref 1–4.8)
LYMPHOCYTES NFR BLD: 26 % (ref 18–48)
MCH RBC QN AUTO: 28 PG (ref 27–31)
MCHC RBC AUTO-ENTMCNC: 31.8 G/DL (ref 32–36)
MCV RBC AUTO: 88 FL (ref 82–98)
MICROSCOPIC COMMENT: NORMAL
MONOCYTES # BLD AUTO: 0.4 K/UL (ref 0.3–1)
MONOCYTES NFR BLD: 7 % (ref 4–15)
NEUTROPHILS # BLD AUTO: 3.4 K/UL (ref 1.8–7.7)
NEUTROPHILS NFR BLD: 62 % (ref 38–73)
NITRITE UR QL STRIP: NEGATIVE
NRBC BLD-RTO: 0 /100 WBC
PH UR STRIP: 8 [PH] (ref 5–8)
PLATELET # BLD AUTO: 238 K/UL (ref 150–450)
PMV BLD AUTO: 9.5 FL (ref 9.2–12.9)
POC IONIZED CALCIUM: 1.23 MMOL/L (ref 1.06–1.42)
POC TCO2 (MEASURED): 25 MMOL/L (ref 23–29)
POCT GLUCOSE: 118 MG/DL (ref 70–110)
POTASSIUM BLD-SCNC: 3.6 MMOL/L (ref 3.5–5.1)
POTASSIUM SERPL-SCNC: 3.7 MMOL/L (ref 3.5–5.1)
PROT SERPL-MCNC: 7.6 G/DL (ref 6–8.4)
PROT UR QL STRIP: NEGATIVE
RBC # BLD AUTO: 3.82 M/UL (ref 4–5.4)
RBC #/AREA URNS AUTO: 2 /HPF (ref 0–4)
SAMPLE: ABNORMAL
SODIUM BLD-SCNC: 143 MMOL/L (ref 136–145)
SODIUM SERPL-SCNC: 144 MMOL/L (ref 136–145)
SP GR UR STRIP: 1 (ref 1–1.03)
SQUAMOUS #/AREA URNS AUTO: 0 /HPF
T4 FREE SERPL-MCNC: 1.29 NG/DL (ref 0.71–1.51)
TSH SERPL DL<=0.005 MIU/L-ACNC: 0.14 UIU/ML (ref 0.4–4)
URN SPEC COLLECT METH UR: ABNORMAL
WBC # BLD AUTO: 5.43 K/UL (ref 3.9–12.7)
WBC #/AREA URNS AUTO: 0 /HPF (ref 0–5)
YEAST UR QL AUTO: NORMAL

## 2023-06-04 PROCEDURE — 96365 THER/PROPH/DIAG IV INF INIT: CPT

## 2023-06-04 PROCEDURE — 80047 BASIC METABLC PNL IONIZED CA: CPT

## 2023-06-04 PROCEDURE — G0378 HOSPITAL OBSERVATION PER HR: HCPCS

## 2023-06-04 PROCEDURE — 99291 PR CRITICAL CARE, E/M 30-74 MINUTES: ICD-10-PCS | Mod: GC,,, | Performed by: EMERGENCY MEDICINE

## 2023-06-04 PROCEDURE — 93010 ELECTROCARDIOGRAM REPORT: CPT | Mod: ,,, | Performed by: INTERNAL MEDICINE

## 2023-06-04 PROCEDURE — 63600175 PHARM REV CODE 636 W HCPCS

## 2023-06-04 PROCEDURE — 85025 COMPLETE CBC W/AUTO DIFF WBC: CPT | Performed by: STUDENT IN AN ORGANIZED HEALTH CARE EDUCATION/TRAINING PROGRAM

## 2023-06-04 PROCEDURE — 96372 THER/PROPH/DIAG INJ SC/IM: CPT | Performed by: PHYSICIAN ASSISTANT

## 2023-06-04 PROCEDURE — 25000003 PHARM REV CODE 250: Performed by: EMERGENCY MEDICINE

## 2023-06-04 PROCEDURE — 99223 PR INITIAL HOSPITAL CARE,LEVL III: ICD-10-PCS | Mod: ,,,

## 2023-06-04 PROCEDURE — 99223 1ST HOSP IP/OBS HIGH 75: CPT | Mod: ,,,

## 2023-06-04 PROCEDURE — 25000003 PHARM REV CODE 250

## 2023-06-04 PROCEDURE — 99285 EMERGENCY DEPT VISIT HI MDM: CPT | Mod: 25

## 2023-06-04 PROCEDURE — 80053 COMPREHEN METABOLIC PANEL: CPT | Performed by: STUDENT IN AN ORGANIZED HEALTH CARE EDUCATION/TRAINING PROGRAM

## 2023-06-04 PROCEDURE — 93010 EKG 12-LEAD: ICD-10-PCS | Mod: ,,, | Performed by: INTERNAL MEDICINE

## 2023-06-04 PROCEDURE — 63600175 PHARM REV CODE 636 W HCPCS: Performed by: PHYSICIAN ASSISTANT

## 2023-06-04 PROCEDURE — 96376 TX/PRO/DX INJ SAME DRUG ADON: CPT

## 2023-06-04 PROCEDURE — 96366 THER/PROPH/DIAG IV INF ADDON: CPT

## 2023-06-04 PROCEDURE — 82962 GLUCOSE BLOOD TEST: CPT

## 2023-06-04 PROCEDURE — 84439 ASSAY OF FREE THYROXINE: CPT | Performed by: STUDENT IN AN ORGANIZED HEALTH CARE EDUCATION/TRAINING PROGRAM

## 2023-06-04 PROCEDURE — 96372 THER/PROPH/DIAG INJ SC/IM: CPT | Mod: 59 | Performed by: PHYSICIAN ASSISTANT

## 2023-06-04 PROCEDURE — 25000003 PHARM REV CODE 250: Performed by: PHYSICIAN ASSISTANT

## 2023-06-04 PROCEDURE — 81001 URINALYSIS AUTO W/SCOPE: CPT | Performed by: STUDENT IN AN ORGANIZED HEALTH CARE EDUCATION/TRAINING PROGRAM

## 2023-06-04 PROCEDURE — 96375 TX/PRO/DX INJ NEW DRUG ADDON: CPT

## 2023-06-04 PROCEDURE — 84443 ASSAY THYROID STIM HORMONE: CPT | Performed by: STUDENT IN AN ORGANIZED HEALTH CARE EDUCATION/TRAINING PROGRAM

## 2023-06-04 PROCEDURE — 99291 CRITICAL CARE FIRST HOUR: CPT | Mod: GC,,, | Performed by: EMERGENCY MEDICINE

## 2023-06-04 PROCEDURE — 93005 ELECTROCARDIOGRAM TRACING: CPT

## 2023-06-04 PROCEDURE — 25000003 PHARM REV CODE 250: Performed by: STUDENT IN AN ORGANIZED HEALTH CARE EDUCATION/TRAINING PROGRAM

## 2023-06-04 RX ORDER — HYDROCODONE BITARTRATE AND ACETAMINOPHEN 10; 325 MG/1; MG/1
1 TABLET ORAL EVERY 6 HOURS PRN
Status: DISCONTINUED | OUTPATIENT
Start: 2023-06-04 | End: 2023-06-05 | Stop reason: HOSPADM

## 2023-06-04 RX ORDER — NALOXONE HCL 0.4 MG/ML
0.02 VIAL (ML) INJECTION
Status: DISCONTINUED | OUTPATIENT
Start: 2023-06-04 | End: 2023-06-05 | Stop reason: HOSPADM

## 2023-06-04 RX ORDER — LEVOTHYROXINE SODIUM 75 UG/1
150 TABLET ORAL DAILY
Status: DISCONTINUED | OUTPATIENT
Start: 2023-06-04 | End: 2023-06-04

## 2023-06-04 RX ORDER — LABETALOL HYDROCHLORIDE 5 MG/ML
10 INJECTION, SOLUTION INTRAVENOUS
Status: COMPLETED | OUTPATIENT
Start: 2023-06-04 | End: 2023-06-04

## 2023-06-04 RX ORDER — AMLODIPINE BESYLATE 10 MG/1
10 TABLET ORAL
Status: COMPLETED | OUTPATIENT
Start: 2023-06-04 | End: 2023-06-04

## 2023-06-04 RX ORDER — ACETAMINOPHEN 325 MG/1
650 TABLET ORAL EVERY 4 HOURS PRN
Status: DISCONTINUED | OUTPATIENT
Start: 2023-06-04 | End: 2023-06-05 | Stop reason: HOSPADM

## 2023-06-04 RX ORDER — NAPROXEN SODIUM 220 MG/1
81 TABLET, FILM COATED ORAL DAILY
Status: DISCONTINUED | OUTPATIENT
Start: 2023-06-04 | End: 2023-06-05 | Stop reason: HOSPADM

## 2023-06-04 RX ORDER — MIRABEGRON 50 MG/1
1 TABLET, FILM COATED, EXTENDED RELEASE ORAL DAILY
COMMUNITY

## 2023-06-04 RX ORDER — PREGABALIN 25 MG/1
25 CAPSULE ORAL 2 TIMES DAILY
Status: DISCONTINUED | OUTPATIENT
Start: 2023-06-04 | End: 2023-06-05 | Stop reason: HOSPADM

## 2023-06-04 RX ORDER — CARVEDILOL 12.5 MG/1
12.5 TABLET ORAL 2 TIMES DAILY
Status: DISCONTINUED | OUTPATIENT
Start: 2023-06-04 | End: 2023-06-04

## 2023-06-04 RX ORDER — DEXTROSE 40 %
30 GEL (GRAM) ORAL
Status: DISCONTINUED | OUTPATIENT
Start: 2023-06-04 | End: 2023-06-05 | Stop reason: HOSPADM

## 2023-06-04 RX ORDER — LEVOTHYROXINE SODIUM 100 UG/1
100 TABLET ORAL
Status: DISCONTINUED | OUTPATIENT
Start: 2023-06-05 | End: 2023-06-05 | Stop reason: HOSPADM

## 2023-06-04 RX ORDER — TALC
6 POWDER (GRAM) TOPICAL NIGHTLY PRN
Status: DISCONTINUED | OUTPATIENT
Start: 2023-06-04 | End: 2023-06-05 | Stop reason: HOSPADM

## 2023-06-04 RX ORDER — SODIUM CHLORIDE 0.9 % (FLUSH) 0.9 %
5 SYRINGE (ML) INJECTION
Status: DISCONTINUED | OUTPATIENT
Start: 2023-06-04 | End: 2023-06-05 | Stop reason: HOSPADM

## 2023-06-04 RX ORDER — ROSUVASTATIN CALCIUM 20 MG/1
20 TABLET, COATED ORAL DAILY
COMMUNITY

## 2023-06-04 RX ORDER — BISACODYL 10 MG
10 SUPPOSITORY, RECTAL RECTAL DAILY PRN
Status: DISCONTINUED | OUTPATIENT
Start: 2023-06-04 | End: 2023-06-05 | Stop reason: HOSPADM

## 2023-06-04 RX ORDER — HYDROCODONE BITARTRATE AND ACETAMINOPHEN 5; 325 MG/1; MG/1
1 TABLET ORAL EVERY 6 HOURS PRN
Status: DISCONTINUED | OUTPATIENT
Start: 2023-06-04 | End: 2023-06-05 | Stop reason: HOSPADM

## 2023-06-04 RX ORDER — AMLODIPINE BESYLATE 10 MG/1
10 TABLET ORAL DAILY
Status: DISCONTINUED | OUTPATIENT
Start: 2023-06-05 | End: 2023-06-05 | Stop reason: HOSPADM

## 2023-06-04 RX ORDER — ONDANSETRON 8 MG/1
8 TABLET, ORALLY DISINTEGRATING ORAL EVERY 8 HOURS PRN
Status: DISCONTINUED | OUTPATIENT
Start: 2023-06-04 | End: 2023-06-05 | Stop reason: HOSPADM

## 2023-06-04 RX ORDER — DEXTROSE 40 %
15 GEL (GRAM) ORAL
Status: DISCONTINUED | OUTPATIENT
Start: 2023-06-04 | End: 2023-06-05 | Stop reason: HOSPADM

## 2023-06-04 RX ORDER — PREGABALIN 25 MG/1
25 CAPSULE ORAL 2 TIMES DAILY
COMMUNITY

## 2023-06-04 RX ORDER — GLUCAGON 1 MG
1 KIT INJECTION
Status: DISCONTINUED | OUTPATIENT
Start: 2023-06-04 | End: 2023-06-05 | Stop reason: HOSPADM

## 2023-06-04 RX ORDER — LEVOTHYROXINE SODIUM 125 UG/1
125 TABLET ORAL EVERY MORNING
Status: ON HOLD | COMMUNITY
Start: 2023-05-08 | End: 2023-06-05 | Stop reason: CLARIF

## 2023-06-04 RX ORDER — ACETAMINOPHEN 325 MG/1
650 TABLET ORAL EVERY 8 HOURS PRN
Status: DISCONTINUED | OUTPATIENT
Start: 2023-06-04 | End: 2023-06-05 | Stop reason: HOSPADM

## 2023-06-04 RX ORDER — INSULIN ASPART 100 [IU]/ML
0-5 INJECTION, SOLUTION INTRAVENOUS; SUBCUTANEOUS
Status: DISCONTINUED | OUTPATIENT
Start: 2023-06-04 | End: 2023-06-05 | Stop reason: HOSPADM

## 2023-06-04 RX ORDER — PROCHLORPERAZINE EDISYLATE 5 MG/ML
2.5 INJECTION INTRAMUSCULAR; INTRAVENOUS ONCE
Status: COMPLETED | OUTPATIENT
Start: 2023-06-04 | End: 2023-06-04

## 2023-06-04 RX ORDER — ATORVASTATIN CALCIUM 40 MG/1
40 TABLET, FILM COATED ORAL DAILY
Status: DISCONTINUED | OUTPATIENT
Start: 2023-06-04 | End: 2023-06-05 | Stop reason: HOSPADM

## 2023-06-04 RX ORDER — POLYETHYLENE GLYCOL 3350 17 G/17G
17 POWDER, FOR SOLUTION ORAL DAILY
Status: DISCONTINUED | OUTPATIENT
Start: 2023-06-04 | End: 2023-06-05 | Stop reason: HOSPADM

## 2023-06-04 RX ORDER — HEPARIN SODIUM 5000 [USP'U]/ML
5000 INJECTION, SOLUTION INTRAVENOUS; SUBCUTANEOUS EVERY 8 HOURS
Status: DISCONTINUED | OUTPATIENT
Start: 2023-06-04 | End: 2023-06-05 | Stop reason: HOSPADM

## 2023-06-04 RX ORDER — MAG HYDROX/ALUMINUM HYD/SIMETH 200-200-20
30 SUSPENSION, ORAL (FINAL DOSE FORM) ORAL 4 TIMES DAILY PRN
Status: DISCONTINUED | OUTPATIENT
Start: 2023-06-04 | End: 2023-06-05 | Stop reason: HOSPADM

## 2023-06-04 RX ORDER — HYDRALAZINE HYDROCHLORIDE 25 MG/1
25 TABLET, FILM COATED ORAL EVERY 8 HOURS PRN
Status: DISCONTINUED | OUTPATIENT
Start: 2023-06-04 | End: 2023-06-05 | Stop reason: HOSPADM

## 2023-06-04 RX ORDER — ONDANSETRON 2 MG/ML
4 INJECTION INTRAMUSCULAR; INTRAVENOUS EVERY 8 HOURS PRN
Status: DISCONTINUED | OUTPATIENT
Start: 2023-06-04 | End: 2023-06-05 | Stop reason: HOSPADM

## 2023-06-04 RX ORDER — MAGNESIUM SULFATE HEPTAHYDRATE 40 MG/ML
2 INJECTION, SOLUTION INTRAVENOUS ONCE
Status: COMPLETED | OUTPATIENT
Start: 2023-06-04 | End: 2023-06-04

## 2023-06-04 RX ORDER — SIMETHICONE 80 MG
1 TABLET,CHEWABLE ORAL 4 TIMES DAILY PRN
Status: DISCONTINUED | OUTPATIENT
Start: 2023-06-04 | End: 2023-06-05 | Stop reason: HOSPADM

## 2023-06-04 RX ADMIN — AMLODIPINE BESYLATE 10 MG: 10 TABLET ORAL at 08:06

## 2023-06-04 RX ADMIN — HEPARIN SODIUM 5000 UNITS: 5000 INJECTION INTRAVENOUS; SUBCUTANEOUS at 02:06

## 2023-06-04 RX ADMIN — POLYETHYLENE GLYCOL 3350 17 G: 17 POWDER, FOR SOLUTION ORAL at 02:06

## 2023-06-04 RX ADMIN — LABETALOL HYDROCHLORIDE 10 MG: 5 INJECTION INTRAVENOUS at 09:06

## 2023-06-04 RX ADMIN — PREGABALIN 25 MG: 25 CAPSULE ORAL at 10:06

## 2023-06-04 RX ADMIN — ATORVASTATIN CALCIUM 40 MG: 40 TABLET, FILM COATED ORAL at 02:06

## 2023-06-04 RX ADMIN — PROCHLORPERAZINE EDISYLATE 2.5 MG: 5 INJECTION INTRAMUSCULAR; INTRAVENOUS at 02:06

## 2023-06-04 RX ADMIN — ACETAMINOPHEN 650 MG: 325 TABLET ORAL at 02:06

## 2023-06-04 RX ADMIN — HEPARIN SODIUM 5000 UNITS: 5000 INJECTION INTRAVENOUS; SUBCUTANEOUS at 10:06

## 2023-06-04 RX ADMIN — MAGNESIUM SULFATE 2 G: 2 INJECTION INTRAVENOUS at 03:06

## 2023-06-04 RX ADMIN — LABETALOL HYDROCHLORIDE 10 MG: 5 INJECTION INTRAVENOUS at 11:06

## 2023-06-04 RX ADMIN — ASPIRIN 81 MG 81 MG: 81 TABLET ORAL at 02:06

## 2023-06-04 NOTE — ASSESSMENT & PLAN NOTE
Patient has a current diagnosis of Hypertensive emergency with end organ damage evidenced by acute kidney injury which is controlled.  Latest blood pressure and vitals reviewed-   Temp:  [97.8 °F (36.6 °C)-98.5 °F (36.9 °C)]   Pulse:  [71-75]   Resp:  [13-18]   BP: (168-254)/()   SpO2:  [96 %-100 %] .   Patient currently off IV antihypertensives.   Home meds for hypertension were reviewed and noted below.   Hypertension Medications             amLODIPine (NORVASC) 10 MG tablet TAKE 1 TABLET BY MOUTH EVERY DAY    hydroCHLOROthiazide (HYDRODIURIL) 25 MG tablet TAKE 1 TABLET BY MOUTH EVERY DAY          Medication adjustment for hospital antihypertensives is as follows-   - per last IM note, pt on coreg and stopped amlodipine/ HCTZ  - switch to coreg 12.5 BID and up titrate  - continue amlodipine  - hydralazine PO PRN  - Pharm D consulted, appreciate assistance     Will aim for controlled BP reduction by medications noted above. Monitor and mitigate end organ damage as indicated.

## 2023-06-04 NOTE — ASSESSMENT & PLAN NOTE
- headache x 2 months, likely 2/2 HTN  - CTH w/o acute abnormality  - given tylenol, mag and compazine  - will monitor response

## 2023-06-04 NOTE — H&P
"[facility] - Emergency Dept  [facility]  History & Physical    Patient Name: Marco Hagen  MRN: [id number]  Patient Class: OP- Observation  Admission Date: 6/4/2023  Attending Physician: Poli Terrazas MD   Primary Care Provider: Primary Doctor No         Patient information was obtained from patient and ER records.     Subjective:     Principal Problem:Hypertensive emergency    Chief Complaint:   Chief Complaint   Patient presents with    Headache     Posterior headache for "weeks". Pt states, "my thyroid is acting up". Pt recently stopped taking BP meds.         HPI: Marco Hagen is a 67 y.o. female with a hx of hypothyroidism, HTN, DM, and CKD presents to the ED for headache and multiple other complaints. Of note, pt was called by her PCP about an AARTI noted on exam and told to come to the ED for evaluation. Patient states she has had a headache from about 2 months that is pressure-like in the occipital region with associated photophobia. Pain is currently a 9/10. Has been compliant with all of her BP meds and was recently told by her physician to stop taking losartan. Unable to verbalize the reasoning. She is not able to tell me the names of most of the medications she is taking. Also endorses back and abdominal and states that she is unsure when she has had her last bowel movement and has struggled with constipation for some time. Denies any decreased PO intake. Denies chest pain, SOB, lightheadedness, dizziness, diarrhea, n/v, and urinary symptoms.     In the ED, /120 with improvement after labetolol injection 10 mg X 2. Hgb 10.7. Cr 1.8. UA not infectious. CTH showed no significant change from prior as detailed above specifically without evidence for acute intracranial hemorrhage or sulcal effacement to suggest large territory recent infarction. Continued partially empty sella in appropriate clinical setting intracranial hypertension to be considered in differential. Admitted to hospital " "medicine.      Past Medical History:   Diagnosis Date    Diabetes mellitus, type 2     with neuropathy    Hypertension     Hypothyroidism        Past Surgical History:   Procedure Laterality Date    BREAST CYST EXCISION      CHOLECYSTECTOMY      HYSTERECTOMY      karen - not for cancer       Review of patient's allergies indicates:   Allergen Reactions    Lisinopril Swelling     angioedema       No current facility-administered medications on file prior to encounter.     Current Outpatient Medications on File Prior to Encounter   Medication Sig    amLODIPine (NORVASC) 10 MG tablet TAKE 1 TABLET BY MOUTH EVERY DAY    aspirin 81 MG Chew One daily    BD ULTRA-FINE MINI PEN NEEDLE 31 gauge x 3/16" Ndle USE AS DIRECTED TWICE DAILY    blood sugar diagnostic Strp Strips and lancets covered by insurance E11.9, check glucose twice daily    blood-glucose meter kit Check glucose daily E11.9 meter covered by insurance, check glucose twice daily    empagliflozin (JARDIANCE) 10 mg tablet Take 10 mg by mouth once daily.    hydroCHLOROthiazide (HYDRODIURIL) 25 MG tablet TAKE 1 TABLET BY MOUTH EVERY DAY    lancets (ACCU-CHEK SOFTCLIX LANCETS) Misc Check glucose twice daily    latanoprost 0.005 % ophthalmic solution PLACE 1 DROP INTO BOTH EYES AT NIGHT    mirabegron (MYRBETRIQ) 50 mg Tb24 Take by mouth.    pregabalin (LYRICA) 25 MG capsule Take 25 mg by mouth 2 (two) times daily.    rosuvastatin (CRESTOR) 20 MG tablet Take 20 mg by mouth once daily.    TOUJEO SOLOSTAR U-300 INSULIN 300 unit/mL (1.5 mL) InPn pen INJ 15 UNITS SC    D    gabapentin (NEURONTIN) 300 MG capsule TAKE 3 CAPSULES BY MOUTH EVERY NIGHT AT BEDTIME    levothyroxine (SYNTHROID) 150 MCG tablet Take 1 tablet (150 mcg total) by mouth once daily.    metFORMIN (GLUCOPHAGE) 1000 MG tablet TAKE 1 TABLET(1000 MG) BY MOUTH DAILY WITH BREAKFAST    pravastatin (PRAVACHOL) 40 MG tablet TAKE 1 TABLET BY MOUTH EVERY DAY     Family History       Problem Relation (Age of Onset)    " Cancer Mother    Diabetes Sister    Heart disease Father    Hyperlipidemia Sister    Hypertension Sister, Sister, Sister    Stroke Mother          Tobacco Use    Smoking status: Never     Passive exposure: Never    Smokeless tobacco: Never   Substance and Sexual Activity    Alcohol use: No    Drug use: Never    Sexual activity: Not Currently     Review of Systems   Constitutional:  Negative for activity change, chills and fever.   HENT:  Negative for trouble swallowing.    Eyes:  Positive for photophobia. Negative for visual disturbance.   Respiratory:  Negative for cough, chest tightness and shortness of breath.    Cardiovascular:  Negative for chest pain, palpitations and leg swelling.   Gastrointestinal:  Negative for abdominal pain, constipation, diarrhea, nausea and vomiting.   Genitourinary:  Negative for dysuria, frequency and hematuria.   Musculoskeletal:  Negative for back pain, gait problem and neck pain.   Skin:  Negative for rash and wound.   Neurological:  Positive for headaches. Negative for dizziness, syncope, speech difficulty and light-headedness.   Psychiatric/Behavioral:  Negative for agitation and confusion. The patient is not nervous/anxious.    Objective:     Vital Signs (Most Recent):  Temp: 97.8 °F (36.6 °C) (06/04/23 0913)  Pulse: 72 (06/04/23 1200)  Resp: 18 (06/04/23 1200)  BP: (!) 168/73 (06/04/23 1200)  SpO2: 97 % (06/04/23 1200) Vital Signs (24h Range):  Temp:  [97.8 °F (36.6 °C)-98.5 °F (36.9 °C)] 97.8 °F (36.6 °C)  Pulse:  [71-75] 72  Resp:  [13-18] 18  SpO2:  [96 %-100 %] 97 %  BP: (168-254)/() 168/73     Weight: 82.6 kg (182 lb)  Body mass index is 31.24 kg/m².     Physical Exam  Vitals and nursing note reviewed.   Constitutional:       General: She is not in acute distress.     Appearance: She is well-developed.   HENT:      Head: Normocephalic and atraumatic.      Mouth/Throat:      Pharynx: No oropharyngeal exudate.   Eyes:      Pupils: Pupils are equal, round, and reactive  to light.   Cardiovascular:      Rate and Rhythm: Normal rate and regular rhythm.      Heart sounds: Normal heart sounds.   Pulmonary:      Effort: Pulmonary effort is normal. No respiratory distress.      Breath sounds: Normal breath sounds. No wheezing.   Abdominal:      General: Bowel sounds are normal. There is no distension.      Palpations: Abdomen is soft.      Tenderness: There is no abdominal tenderness.   Musculoskeletal:         General: No tenderness. Normal range of motion.      Cervical back: Normal range of motion and neck supple.   Skin:     General: Skin is warm and dry.      Capillary Refill: Capillary refill takes less than 2 seconds.      Findings: No rash.   Neurological:      Mental Status: She is alert and oriented to person, place, and time.      Cranial Nerves: No cranial nerve deficit.      Sensory: No sensory deficit.      Coordination: Coordination normal.            CRANIAL NERVES     CN III, IV, VI   Pupils are equal, round, and reactive to light.     Significant Labs: All pertinent labs within the past 24 hours have been reviewed.  BMP:   Recent Labs   Lab 06/04/23  0839   *      K 3.7      CO2 25   BUN 23   CREATININE 1.8*   CALCIUM 10.7*     CBC:   Recent Labs   Lab 06/04/23  0839 06/04/23  0845   WBC 5.43  --    HGB 10.7*  --    HCT 33.6* 31*     --        Significant Imaging: I have reviewed all pertinent imaging results/findings within the past 24 hours.    Imaging Results              CT Head Without Contrast (Final result)  Result time 06/04/23 11:27:33      Final result by Scotty Wilson DO (06/04/23 11:27:33)                   Impression:      No significant change from prior as detailed above specifically without evidence for acute intracranial hemorrhage or sulcal effacement to suggest large territory recent infarction.    Clinical correlation and further evaluation as warranted.    Continued partially empty sella in appropriate clinical setting  intracranial hypertension to be considered in differential.      Electronically signed by: Scotty Wilson DO  Date:    06/04/2023  Time:    11:27               Narrative:    EXAMINATION:  CT HEAD WITHOUT CONTRAST    CLINICAL HISTORY:  Headache, new or worsening (Age >= 50y);    TECHNIQUE:  Multiple sequential 5 mm axial images of the head without contrast.  Coronal and sagittal reformatted imaging from the axial acquisition.    COMPARISON:  11/17/2018    FINDINGS:  Scattered artifact from beam hardening and motion most pronounced along the posterior fossa with severe distortion of the cerebellum.  Within limits of the study there is no evidence for acute intracranial hemorrhage or sulcal effacement to suggest large territory recent infarction.  The ventricles are normal in size without hydrocephalus.  There is no midline shift or mass effect.  Visualized paranasal sinuses and mastoid air cells are clear.  Question bilateral globe proptosis.  Continued partially empty sella.                                        Assessment/Plan:     * Hypertensive emergency  Patient has a current diagnosis of Hypertensive emergency with end organ damage evidenced by acute kidney injury which is controlled.  Latest blood pressure and vitals reviewed-   Temp:  [97.8 °F (36.6 °C)-98.5 °F (36.9 °C)]   Pulse:  [71-75]   Resp:  [13-18]   BP: (168-254)/()   SpO2:  [96 %-100 %] .   Patient currently off IV antihypertensives.   Home meds for hypertension were reviewed and noted below.   Hypertension Medications               amLODIPine (NORVASC) 10 MG tablet TAKE 1 TABLET BY MOUTH EVERY DAY    hydroCHLOROthiazide (HYDRODIURIL) 25 MG tablet TAKE 1 TABLET BY MOUTH EVERY DAY            Medication adjustment for hospital antihypertensives is as follows-   - continue amlodipine, will add on further medications pending AARTI resolution  - hydralazine PO PRN  - Pharm D consulted, appreciate assistance     Will aim for controlled BP reduction by  medications noted above. Monitor and mitigate end organ damage as indicated.    Headache  - headache x 2 months, likely 2/2 HTN  - CTH w/o acute abnormality  - given tylenol, mag and compazine  - will monitor response    Constipation  - unsure of last bowel movement   - will begin mirilax daily    Acute kidney injury superimposed on chronic kidney disease  - Cr 1.8 on admit, baseline ~ 1.4  - holding home HCTZ, lisinopril, metformin  - renal US ordered  - BP control  - hold off on IVF for now given HTN emergency  - avoid nephrotoxic agents  - monitor with daily labs    Hypertension associated with stage 4 chronic kidney disease due to type 2 diabetes mellitus  Patient's FSGs are controlled on current medication regimen.  Last A1c reviewed-   Lab Results   Component Value Date    HGBA1C 6.5 (H) 06/02/2023     Most recent fingerstick glucose reviewed- No results for input(s): POCTGLUCOSE in the last 24 hours.  Current correctional scale  Low  Maintain anti-hyperglycemic dose as follows-   Antihyperglycemics (From admission, onward)      Start     Stop Route Frequency Ordered    06/04/23 1417  insulin aspart U-100 pen 0-5 Units         -- SubQ Before meals & nightly PRN 06/04/23 1318          Hold Oral hypoglycemics while patient is in the hospital.    - AARTI and hypertensive emergency, see above    Dyslipidemia  - continue statin    Hypothyroidism following radioiodine therapy  - continue synthroid>> recently decreased to 100 mg    Anemia  Hgb 10.7  - no overt signs of bleeding  - anemia panel pending      Essential hypertension  - see hypertensive emergency for management       VTE Risk Mitigation (From admission, onward)           Ordered     heparin (porcine) injection 5,000 Units  Every 8 hours         06/04/23 1318     IP VTE HIGH RISK PATIENT  Once         06/04/23 1318     Place sequential compression device  Until discontinued         06/04/23 1318                         On 06/04/2023, patient should be placed  in hospital observation services under my care in collaboration with Poli Terrazas MD.      Nanette Pineda PA-C  Department of Hospital Medicine  [facility] - Emergency Dept

## 2023-06-04 NOTE — ASSESSMENT & PLAN NOTE
- Cr 1.8 on admit, baseline ~ 1.4  - holding home HCTZ, lisinopril, metformin  - renal US ordered  - BP control  - hold off on IVF for now given HTN emergency  - avoid nephrotoxic agents  - monitor with daily labs

## 2023-06-04 NOTE — ED NOTES
I-STAT Chem-8+ Results:   Value Reference Range   Sodium 143 136-145 mmol/L   Potassium  3.6 3.5-5.1 mmol/L   Chloride 108  mmol/L   Ionized Calcium 1.23 1.06-1.42 mmol/L   CO2 (measured) 25 23-29 mmol/L   Glucose 120  mg/dL   BUN 26 6-30 mg/dL   Creatinine 1.8 0.5-1.4 mg/dL   Hematocrit 31 36-54%

## 2023-06-04 NOTE — SUBJECTIVE & OBJECTIVE
"Past Medical History:   Diagnosis Date    Diabetes mellitus, type 2     with neuropathy    Hypertension     Hypothyroidism        Past Surgical History:   Procedure Laterality Date    BREAST CYST EXCISION      CHOLECYSTECTOMY      HYSTERECTOMY      karen - not for cancer       Review of patient's allergies indicates:   Allergen Reactions    Lisinopril Swelling     angioedema       No current facility-administered medications on file prior to encounter.     Current Outpatient Medications on File Prior to Encounter   Medication Sig    amLODIPine (NORVASC) 10 MG tablet TAKE 1 TABLET BY MOUTH EVERY DAY    aspirin 81 MG Chew One daily    BD ULTRA-FINE MINI PEN NEEDLE 31 gauge x 3/16" Ndle USE AS DIRECTED TWICE DAILY    blood sugar diagnostic Strp Strips and lancets covered by insurance E11.9, check glucose twice daily    blood-glucose meter kit Check glucose daily E11.9 meter covered by insurance, check glucose twice daily    empagliflozin (JARDIANCE) 10 mg tablet Take 10 mg by mouth once daily.    hydroCHLOROthiazide (HYDRODIURIL) 25 MG tablet TAKE 1 TABLET BY MOUTH EVERY DAY    lancets (ACCU-CHEK SOFTCLIX LANCETS) Misc Check glucose twice daily    latanoprost 0.005 % ophthalmic solution PLACE 1 DROP INTO BOTH EYES AT NIGHT    mirabegron (MYRBETRIQ) 50 mg Tb24 Take by mouth.    pregabalin (LYRICA) 25 MG capsule Take 25 mg by mouth 2 (two) times daily.    rosuvastatin (CRESTOR) 20 MG tablet Take 20 mg by mouth once daily.    TOUJEO SOLOSTAR U-300 INSULIN 300 unit/mL (1.5 mL) InPn pen INJ 15 UNITS SC    D    gabapentin (NEURONTIN) 300 MG capsule TAKE 3 CAPSULES BY MOUTH EVERY NIGHT AT BEDTIME    levothyroxine (SYNTHROID) 150 MCG tablet Take 1 tablet (150 mcg total) by mouth once daily.    metFORMIN (GLUCOPHAGE) 1000 MG tablet TAKE 1 TABLET(1000 MG) BY MOUTH DAILY WITH BREAKFAST    pravastatin (PRAVACHOL) 40 MG tablet TAKE 1 TABLET BY MOUTH EVERY DAY     Family History       Problem Relation (Age of Onset)    Cancer Mother "    Diabetes Sister    Heart disease Father    Hyperlipidemia Sister    Hypertension Sister, Sister, Sister    Stroke Mother          Tobacco Use    Smoking status: Never     Passive exposure: Never    Smokeless tobacco: Never   Substance and Sexual Activity    Alcohol use: No    Drug use: Never    Sexual activity: Not Currently     Review of Systems   Constitutional:  Negative for activity change, chills and fever.   HENT:  Negative for trouble swallowing.    Eyes:  Positive for photophobia. Negative for visual disturbance.   Respiratory:  Negative for cough, chest tightness and shortness of breath.    Cardiovascular:  Negative for chest pain, palpitations and leg swelling.   Gastrointestinal:  Negative for abdominal pain, constipation, diarrhea, nausea and vomiting.   Genitourinary:  Negative for dysuria, frequency and hematuria.   Musculoskeletal:  Negative for back pain, gait problem and neck pain.   Skin:  Negative for rash and wound.   Neurological:  Positive for headaches. Negative for dizziness, syncope, speech difficulty and light-headedness.   Psychiatric/Behavioral:  Negative for agitation and confusion. The patient is not nervous/anxious.    Objective:     Vital Signs (Most Recent):  Temp: 97.8 °F (36.6 °C) (06/04/23 0913)  Pulse: 72 (06/04/23 1200)  Resp: 18 (06/04/23 1200)  BP: (!) 168/73 (06/04/23 1200)  SpO2: 97 % (06/04/23 1200) Vital Signs (24h Range):  Temp:  [97.8 °F (36.6 °C)-98.5 °F (36.9 °C)] 97.8 °F (36.6 °C)  Pulse:  [71-75] 72  Resp:  [13-18] 18  SpO2:  [96 %-100 %] 97 %  BP: (168-254)/() 168/73     Weight: 82.6 kg (182 lb)  Body mass index is 31.24 kg/m².     Physical Exam  Vitals and nursing note reviewed.   Constitutional:       General: She is not in acute distress.     Appearance: She is well-developed.   HENT:      Head: Normocephalic and atraumatic.      Mouth/Throat:      Pharynx: No oropharyngeal exudate.   Eyes:      Pupils: Pupils are equal, round, and reactive to light.    Cardiovascular:      Rate and Rhythm: Normal rate and regular rhythm.      Heart sounds: Normal heart sounds.   Pulmonary:      Effort: Pulmonary effort is normal. No respiratory distress.      Breath sounds: Normal breath sounds. No wheezing.   Abdominal:      General: Bowel sounds are normal. There is no distension.      Palpations: Abdomen is soft.      Tenderness: There is no abdominal tenderness.   Musculoskeletal:         General: No tenderness. Normal range of motion.      Cervical back: Normal range of motion and neck supple.   Skin:     General: Skin is warm and dry.      Capillary Refill: Capillary refill takes less than 2 seconds.      Findings: No rash.   Neurological:      Mental Status: She is alert and oriented to person, place, and time.      Cranial Nerves: No cranial nerve deficit.      Sensory: No sensory deficit.      Coordination: Coordination normal.            CRANIAL NERVES     CN III, IV, VI   Pupils are equal, round, and reactive to light.     Significant Labs: All pertinent labs within the past 24 hours have been reviewed.  BMP:   Recent Labs   Lab 06/04/23  0839   *      K 3.7      CO2 25   BUN 23   CREATININE 1.8*   CALCIUM 10.7*     CBC:   Recent Labs   Lab 06/04/23  0839 06/04/23  0845   WBC 5.43  --    HGB 10.7*  --    HCT 33.6* 31*     --        Significant Imaging: I have reviewed all pertinent imaging results/findings within the past 24 hours.    Imaging Results              CT Head Without Contrast (Final result)  Result time 06/04/23 11:27:33      Final result by Scotty Wilson DO (06/04/23 11:27:33)                   Impression:      No significant change from prior as detailed above specifically without evidence for acute intracranial hemorrhage or sulcal effacement to suggest large territory recent infarction.    Clinical correlation and further evaluation as warranted.    Continued partially empty sella in appropriate clinical setting  intracranial hypertension to be considered in differential.      Electronically signed by: Scotty Wilson DO  Date:    06/04/2023  Time:    11:27               Narrative:    EXAMINATION:  CT HEAD WITHOUT CONTRAST    CLINICAL HISTORY:  Headache, new or worsening (Age >= 50y);    TECHNIQUE:  Multiple sequential 5 mm axial images of the head without contrast.  Coronal and sagittal reformatted imaging from the axial acquisition.    COMPARISON:  11/17/2018    FINDINGS:  Scattered artifact from beam hardening and motion most pronounced along the posterior fossa with severe distortion of the cerebellum.  Within limits of the study there is no evidence for acute intracranial hemorrhage or sulcal effacement to suggest large territory recent infarction.  The ventricles are normal in size without hydrocephalus.  There is no midline shift or mass effect.  Visualized paranasal sinuses and mastoid air cells are clear.  Question bilateral globe proptosis.  Continued partially empty sella.

## 2023-06-04 NOTE — Clinical Note
Diagnosis: Hypertensive emergency [035443]   Future Attending Provider: LAUREN DENNY [[id number]]   Admitting Provider:: LAUREN DENNY [[id number]]

## 2023-06-04 NOTE — ASSESSMENT & PLAN NOTE
Patient's FSGs are controlled on current medication regimen.  Last A1c reviewed-   Lab Results   Component Value Date    HGBA1C 6.5 (H) 06/02/2023     Most recent fingerstick glucose reviewed- No results for input(s): POCTGLUCOSE in the last 24 hours.  Current correctional scale  Low  Maintain anti-hyperglycemic dose as follows-   Antihyperglycemics (From admission, onward)    Start     Stop Route Frequency Ordered    06/04/23 1417  insulin aspart U-100 pen 0-5 Units         -- SubQ Before meals & nightly PRN 06/04/23 1318        Hold Oral hypoglycemics while patient is in the hospital.    - AARTI and hypertensive emergency, see above

## 2023-06-04 NOTE — HPI
Marco Hagen is a 67 y.o. female with a hx of hypothyroidism, HTN, DM, and CKD presents to the ED for headache and multiple other complaints. Of note, pt was called by her PCP about an AARTI noted on exam and told to come to the ED for evaluation. Patient states she has had a headache from about 2 months that is pressure-like in the occipital region with associated photophobia. Pain is currently a 9/10. Has been compliant with all of her BP meds and was recently told by her physician to stop taking losartan. Unable to verbalize the reasoning. She is not able to tell me the names of most of the medications she is taking. Also endorses back and abdominal and states that she is unsure when she has had her last bowel movement and has struggled with constipation for some time. Denies any decreased PO intake. Denies chest pain, SOB, lightheadedness, dizziness, diarrhea, n/v, and urinary symptoms.     In the ED, /120 with improvement after labetolol injection 10 mg X 2. Hgb 10.7. Cr 1.8. UA not infectious. CTH showed no significant change from prior as detailed above specifically without evidence for acute intracranial hemorrhage or sulcal effacement to suggest large territory recent infarction. Continued partially empty sella in appropriate clinical setting intracranial hypertension to be considered in differential. Admitted to hospital medicine.

## 2023-06-04 NOTE — ED NOTES
Pt identifiers Marco Hagen were checkes  and are correct  LOC: The patient is awake, alert, aware of environment with an appropriate affect. Oriented x4, speaking appropriately  APPEARANCE: Pt resting comfortably, in no acute distress, pt is clean and well groomed, clothing properly fastened  SKIN: Skin warm, dry and intact, normal skin turgor, moist mucus membranes  RESPIRATORY: Airway is open and patent, respirations are spontaneous, even and unlabored, normal effort and rate Breath sounds clear errol to all lung fields on auscultation  CARDIAC: Radial pulses strong and irregular ,1 + peripheral edema noted, capillary refill < 3 seconds, bilateral radial pulses 2+  ABDOMEN: Soft, nontender, nondistended. Bowel sounds  present to all four quad of abd on auscultation   NEUROLOGIC: PERRL, facial expression is symmetrical, patient moving all extremities spontaneously, normal sensation in all extremities when touched with a finger.  Follows all commands appropriately  MUSCULOSKELETAL: No obvious deformities.

## 2023-06-04 NOTE — ED TRIAGE NOTES
T complains of headache times two months Rates pain a 9/10  Pt states her PCP called her yesterday and told her to come to the ED

## 2023-06-04 NOTE — ED PROVIDER NOTES
"Encounter Date: 6/4/2023       History     Chief Complaint   Patient presents with    Headache     Posterior headache for "weeks". Pt states, "my thyroid is acting up". Pt recently stopped taking BP meds.      67 year old female with T2DM, HTN presenting to the ED with headache, elevated Cr on labs. Reported that she has had an occipital headache x 1 month, that has been intermittent, feels like a normal headache. Has been taking tylenol with relief of headache though it returns after a few hours. Denies any vision changes, numbness or weakness in upper or lower extremities. Denies any nausea/vomiting, abd pain, urinary symptoms, chest pain, or SOB.  Went to PCP yesterday, was found to have AARTI and was planning to come yesterday though had no way of getting to the ED. Was told to stop taking her metformin and lisinopril yesterday.      Review of patient's allergies indicates:   Allergen Reactions    Lisinopril Swelling     angioedema     Past Medical History:   Diagnosis Date    Diabetes mellitus, type 2     with neuropathy    Hypertension     Hypothyroidism      Past Surgical History:   Procedure Laterality Date    BREAST CYST EXCISION      CHOLECYSTECTOMY      HYSTERECTOMY      karen - not for cancer     Family History   Problem Relation Age of Onset    Cancer Mother         renal and colon    Stroke Mother     Heart disease Father     Diabetes Sister     Hypertension Sister     Hypertension Sister     Hyperlipidemia Sister     Hypertension Sister     Melanoma Neg Hx      Social History     Tobacco Use    Smoking status: Never     Passive exposure: Never    Smokeless tobacco: Never   Substance Use Topics    Alcohol use: No    Drug use: Never     Review of Systems    Physical Exam     Initial Vitals   BP Pulse Resp Temp SpO2   06/04/23 0728 06/04/23 0727 06/04/23 0727 06/04/23 0727 06/04/23 0727   (!) 254/120 75 15 98.5 °F (36.9 °C) 100 %      MAP       --                Physical Exam    Nursing note and vitals " reviewed.  Constitutional: She appears well-developed and well-nourished. She is not diaphoretic. No distress.   HENT:   Head: Normocephalic and atraumatic.   Eyes: Conjunctivae and EOM are normal. Right eye exhibits no discharge. Left eye exhibits no discharge. No scleral icterus.   Neck:   Normal range of motion.  Cardiovascular:  Normal rate and regular rhythm.     Exam reveals no gallop and no friction rub.       No murmur heard.  Pulmonary/Chest: No respiratory distress. She has no wheezes. She has no rhonchi. She has no rales. She exhibits no tenderness.   Abdominal: Abdomen is soft. She exhibits no distension and no mass. There is no abdominal tenderness. There is no rebound and no guarding.   Musculoskeletal:      Cervical back: Normal range of motion.     Neurological: She is alert and oriented to person, place, and time. She has normal strength. No cranial nerve deficit or sensory deficit.   RUE 4/5 strength, RLE 0/5 strength. No facial drooping, no change in speech   Skin: Skin is warm and dry. No erythema. No pallor.       ED Course   Procedures  Labs Reviewed   CBC W/ AUTO DIFFERENTIAL - Abnormal; Notable for the following components:       Result Value    RBC 3.82 (*)     Hemoglobin 10.7 (*)     Hematocrit 33.6 (*)     MCHC 31.8 (*)     All other components within normal limits   COMPREHENSIVE METABOLIC PANEL - Abnormal; Notable for the following components:    Glucose 119 (*)     Creatinine 1.8 (*)     Calcium 10.7 (*)     eGFR 30.1 (*)     All other components within normal limits   URINALYSIS, REFLEX TO URINE CULTURE - Abnormal; Notable for the following components:    Color, UA Colorless (*)     Specific [place], UA 1.000 (*)     Glucose, UA 3+ (*)     Occult Blood UA 1+ (*)     All other components within normal limits    Narrative:     Specimen Source->Urine   TSH - Abnormal; Notable for the following components:    TSH 0.142 (*)     All other components within normal limits   ISTAT PROCEDURE -  Abnormal; Notable for the following components:    POC Glucose 120 (*)     POC Creatinine 1.8 (*)     POC Hematocrit 31 (*)     All other components within normal limits   POCT GLUCOSE - Abnormal; Notable for the following components:    POCT Glucose 118 (*)     All other components within normal limits   T4, FREE   URINALYSIS MICROSCOPIC    Narrative:     Specimen Source->Urine   TROPONIN I   B-TYPE NATRIURETIC PEPTIDE   POCT GLUCOSE, HAND-HELD DEVICE   POCT GLUCOSE, HAND-HELD DEVICE   ISTAT CHEM8     EKG Readings: (Independently Interpreted)   Normal sinus rhythm, rate of 70. PAC noted. No ST elevations or depressions concerning for ischemia.  No STEMI per my independent interpretation     ECG Results              EKG 12-lead (Final result)  Result time 06/05/23 08:26:26      Final result by Interface, Lab In [facility] (06/05/23 08:26:26)                   Narrative:    Test Reason : I10,    Vent. Rate : 070 BPM     Atrial Rate : 070 BPM     P-R Int : 158 ms          QRS Dur : 088 ms      QT Int : 376 ms       P-R-T Axes : 042 -19 055 degrees     QTc Int : 406 ms    Sinus rhythm with Premature supraventricular complexes  Otherwise normal ECG  When compared with ECG of 13-DEC-2018 13:18,  No significant change was found  Confirmed by Hieu Moncada MD ([id number]) on 6/5/2023 8:26:21 AM    Referred By: [email or web address]   SELF           Confirmed By:Hieu Moncada MD                                  Imaging Results              US Retroperitoneal Complete (Final result)  Result time 06/04/23 18:04:28      Final result by Cameron Castle MD (06/04/23 18:04:28)                   Impression:      Left renal stones and simple cyst.    Elevated resistive indices suggesting bilateral medical renal disease.  No hydronephrosis.    Electronically signed by resident: Prudence Ritter  Date:    06/04/2023  Time:    17:35    Electronically signed by: Cameron Castle MD  Date:    06/04/2023  Time:    18:04               Narrative:    EXAMINATION:  US  RETROPERITONEAL COMPLETE    CLINICAL HISTORY:  AARTI;    TECHNIQUE:  Ultrasound of the kidneys and urinary bladder was performed including color flow and Doppler evaluation of the kidneys.    COMPARISON:  None    FINDINGS:  Right kidney: The right kidney measures 9.9 cm.  No cortical thinning. No loss of corticomedullary distinction. Resistive index measures 0.73.  No mass.  No renal stone.  No hydronephrosis.    Left kidney: The left kidney measures 9.3 cm.  No cortical thinning. No loss of corticomedullary distinction. Resistive index measures 0.72.  Tiny peripheral anechoic focus measuring 0.5 x 0.5 x 0.5 cm.  Two echogenic foci measuring up to 1.0 cm with posterior acoustic shadowing.  No hydronephrosis.    The bladder is well distended and unremarkable.                                       CT Head Without Contrast (Final result)  Result time 06/04/23 11:27:33      Final result by Scotty Wilson DO (06/04/23 11:27:33)                   Impression:      No significant change from prior as detailed above specifically without evidence for acute intracranial hemorrhage or sulcal effacement to suggest large territory recent infarction.    Clinical correlation and further evaluation as warranted.    Continued partially empty sella in appropriate clinical setting intracranial hypertension to be considered in differential.      Electronically signed by: Scotty Wilson DO  Date:    06/04/2023  Time:    11:27               Narrative:    EXAMINATION:  CT HEAD WITHOUT CONTRAST    CLINICAL HISTORY:  Headache, new or worsening (Age >= 50y);    TECHNIQUE:  Multiple sequential 5 mm axial images of the head without contrast.  Coronal and sagittal reformatted imaging from the axial acquisition.    COMPARISON:  11/17/2018    FINDINGS:  Scattered artifact from beam hardening and motion most pronounced along the posterior fossa with severe distortion of the cerebellum.  Within limits of the study there is no evidence for acute  intracranial hemorrhage or sulcal effacement to suggest large territory recent infarction.  The ventricles are normal in size without hydrocephalus.  There is no midline shift or mass effect.  Visualized paranasal sinuses and mastoid air cells are clear.  Question bilateral globe proptosis.  Continued partially empty sella.                                    X-Rays:   Independently Interpreted Readings:   Other Readings:  No pneumonia, pneumothorax, or pleural effusion noted on CXR      Medications   sodium chloride 0.9% flush 5 mL (has no administration in time range)   melatonin tablet 6 mg (has no administration in time range)   polyethylene glycol packet 17 g (17 g Oral Given 6/5/23 5248)   bisacodyL suppository 10 mg (has no administration in time range)   acetaminophen tablet 650 mg (has no administration in time range)   acetaminophen tablet 650 mg (650 mg Oral Given 6/4/23 6733)   HYDROcodone-acetaminophen 5-325 mg per tablet 1 tablet (has no administration in time range)   naloxone 0.4 mg/mL injection 0.02 mg (has no administration in time range)   glucagon (human recombinant) injection 1 mg (has no administration in time range)   dextrose 10% bolus 125 mL 125 mL (has no administration in time range)   dextrose 10% bolus 250 mL 250 mL (has no administration in time range)   dextrose 40 % gel 15,000 mg (has no administration in time range)   dextrose 40 % gel 30,000 mg (has no administration in time range)   heparin (porcine) injection 5,000 Units (5,000 Units Subcutaneous Given 6/5/23 2406)   HYDROcodone-acetaminophen  mg per tablet 1 tablet (has no administration in time range)   ondansetron disintegrating tablet 8 mg (has no administration in time range)   ondansetron injection 4 mg (has no administration in time range)   insulin aspart U-100 pen 0-5 Units (has no administration in time range)   simethicone chewable tablet 80 mg (has no administration in time range)   aluminum-magnesium  hydroxide-simethicone 200-200-20 mg/5 mL suspension 30 mL (has no administration in time range)   hydrALAZINE tablet 25 mg (25 mg Oral Not Given 6/4/23 1528)   aspirin chewable tablet 81 mg (81 mg Oral Given 6/5/23 0843)   pregabalin capsule 25 mg (25 mg Oral Given 6/5/23 0843)   atorvastatin tablet 40 mg (40 mg Oral Given 6/5/23 0843)   amLODIPine tablet 10 mg (10 mg Oral Given 6/5/23 0843)   levothyroxine tablet 100 mcg (100 mcg Oral Given 6/5/23 0553)   amLODIPine tablet 10 mg (10 mg Oral Given 6/4/23 0832)   labetaloL injection 10 mg (10 mg Intravenous Given 6/4/23 0930)   labetaloL injection 10 mg (10 mg Intravenous Given 6/4/23 1139)   prochlorperazine injection Soln 2.5 mg (2.5 mg Intravenous Given 6/4/23 1441)   magnesium sulfate 2g in water 50mL IVPB (premix) (0 g Intravenous Stopped 6/4/23 1729)     Medical Decision Making:   History:   Old Medical Records: I decided to obtain old medical records.  Initial Assessment:   Emergent evaluation of 67 year old female presenting to the ED with AARTI on labs as well as headache without any focal neuro symptoms. Severely hypertensive on arrival.    Differential includes but is not limited to hypertensive emergency, asymptomatic hypertension, tension HA, intracranial hemorrhage, dehydration    ISTAT showed AARTI with Cr of 1.8. Suspect hypertensive emergency causing patient's AARTI. EKG showed no concern for ischemic changes. CT head showed no concern for intracranial hemorrhage. CBC showed no leukocytosis or anemia. CMP showed no electrolyte abnormalities concerning for hospitalization.  Given two doses of IV labetalol to decrease SBP by about 25% in congruence with treatment for hypertensive emergency.  Discussed with  for admission.  Independently Interpreted Test(s):   I have ordered and independently interpreted X-rays - see prior notes.  I have ordered and independently interpreted EKG Reading(s) - see prior notes  Clinical Tests:   Lab Tests: Ordered and  Reviewed  Radiological Study: Ordered and Reviewed  Medical Tests: Ordered and Reviewed  Other:   I have discussed this case with another health care provider.       <> Summary of the Discussion:                         Clinical Impression:   Final diagnoses:  [I10] Hypertension  [I16.1] Hypertensive emergency (Primary)  [N17.9] AARTI (acute kidney injury)  [R51.9] Nonintractable headache, unspecified chronicity pattern, unspecified headache type        ED Disposition Condition    Observation Stable                Ronald Magallanes MD  Resident  06/05/23 1055

## 2023-06-05 VITALS
BODY MASS INDEX: 29.5 KG/M2 | SYSTOLIC BLOOD PRESSURE: 152 MMHG | WEIGHT: 172.81 LBS | DIASTOLIC BLOOD PRESSURE: 72 MMHG | HEART RATE: 82 BPM | OXYGEN SATURATION: 99 % | RESPIRATION RATE: 18 BRPM | TEMPERATURE: 97 F | HEIGHT: 64 IN

## 2023-06-05 LAB
ANION GAP SERPL CALC-SCNC: 9 MMOL/L (ref 8–16)
BASOPHILS # BLD AUTO: 0.03 K/UL (ref 0–0.2)
BASOPHILS NFR BLD: 0.7 % (ref 0–1.9)
BUN SERPL-MCNC: 21 MG/DL (ref 8–23)
CALCIUM SERPL-MCNC: 11 MG/DL (ref 8.7–10.5)
CHLORIDE SERPL-SCNC: 107 MMOL/L (ref 95–110)
CO2 SERPL-SCNC: 27 MMOL/L (ref 23–29)
CREAT SERPL-MCNC: 1.5 MG/DL (ref 0.5–1.4)
DIFFERENTIAL METHOD: ABNORMAL
EOSINOPHIL # BLD AUTO: 0.2 K/UL (ref 0–0.5)
EOSINOPHIL NFR BLD: 4.2 % (ref 0–8)
ERYTHROCYTE [DISTWIDTH] IN BLOOD BY AUTOMATED COUNT: 12.9 % (ref 11.5–14.5)
EST. GFR  (NO RACE VARIABLE): 37.5 ML/MIN/1.73 M^2
FERRITIN SERPL-MCNC: 198 NG/ML (ref 20–300)
FOLATE SERPL-MCNC: 10.5 NG/ML (ref 4–24)
GLUCOSE SERPL-MCNC: 121 MG/DL (ref 70–110)
HCT VFR BLD AUTO: 33.6 % (ref 37–48.5)
HGB BLD-MCNC: 10.8 G/DL (ref 12–16)
IMM GRANULOCYTES # BLD AUTO: 0.01 K/UL (ref 0–0.04)
IMM GRANULOCYTES NFR BLD AUTO: 0.2 % (ref 0–0.5)
IRON SERPL-MCNC: 64 UG/DL (ref 30–160)
LYMPHOCYTES # BLD AUTO: 1.4 K/UL (ref 1–4.8)
LYMPHOCYTES NFR BLD: 32.1 % (ref 18–48)
MAGNESIUM SERPL-MCNC: 2.5 MG/DL (ref 1.6–2.6)
MCH RBC QN AUTO: 27.9 PG (ref 27–31)
MCHC RBC AUTO-ENTMCNC: 32.1 G/DL (ref 32–36)
MCV RBC AUTO: 87 FL (ref 82–98)
MONOCYTES # BLD AUTO: 0.4 K/UL (ref 0.3–1)
MONOCYTES NFR BLD: 9 % (ref 4–15)
NEUTROPHILS # BLD AUTO: 2.3 K/UL (ref 1.8–7.7)
NEUTROPHILS NFR BLD: 53.8 % (ref 38–73)
NRBC BLD-RTO: 0 /100 WBC
PLATELET # BLD AUTO: 233 K/UL (ref 150–450)
PMV BLD AUTO: 9.7 FL (ref 9.2–12.9)
POCT GLUCOSE: 103 MG/DL (ref 70–110)
POCT GLUCOSE: 112 MG/DL (ref 70–110)
POCT GLUCOSE: 146 MG/DL (ref 70–110)
POCT GLUCOSE: 91 MG/DL (ref 70–110)
POTASSIUM SERPL-SCNC: 3.5 MMOL/L (ref 3.5–5.1)
RBC # BLD AUTO: 3.87 M/UL (ref 4–5.4)
SATURATED IRON: 21 % (ref 20–50)
SODIUM SERPL-SCNC: 143 MMOL/L (ref 136–145)
TOTAL IRON BINDING CAPACITY: 306 UG/DL (ref 250–450)
TRANSFERRIN SERPL-MCNC: 207 MG/DL (ref 200–375)
VIT B12 SERPL-MCNC: 568 PG/ML (ref 210–950)
WBC # BLD AUTO: 4.33 K/UL (ref 3.9–12.7)

## 2023-06-05 PROCEDURE — 99239 HOSP IP/OBS DSCHRG MGMT >30: CPT | Mod: ,,,

## 2023-06-05 PROCEDURE — 99239 PR HOSPITAL DISCHARGE DAY,>30 MIN: ICD-10-PCS | Mod: ,,,

## 2023-06-05 PROCEDURE — 82728 ASSAY OF FERRITIN: CPT

## 2023-06-05 PROCEDURE — 84466 ASSAY OF TRANSFERRIN: CPT

## 2023-06-05 PROCEDURE — 36415 COLL VENOUS BLD VENIPUNCTURE: CPT

## 2023-06-05 PROCEDURE — 85025 COMPLETE CBC W/AUTO DIFF WBC: CPT | Performed by: PHYSICIAN ASSISTANT

## 2023-06-05 PROCEDURE — 82607 VITAMIN B-12: CPT

## 2023-06-05 PROCEDURE — G0378 HOSPITAL OBSERVATION PER HR: HCPCS

## 2023-06-05 PROCEDURE — 25000003 PHARM REV CODE 250

## 2023-06-05 PROCEDURE — 80048 BASIC METABOLIC PNL TOTAL CA: CPT | Performed by: PHYSICIAN ASSISTANT

## 2023-06-05 PROCEDURE — 96372 THER/PROPH/DIAG INJ SC/IM: CPT | Performed by: PHYSICIAN ASSISTANT

## 2023-06-05 PROCEDURE — 63600175 PHARM REV CODE 636 W HCPCS: Performed by: PHYSICIAN ASSISTANT

## 2023-06-05 PROCEDURE — 82746 ASSAY OF FOLIC ACID SERUM: CPT

## 2023-06-05 PROCEDURE — 25000003 PHARM REV CODE 250: Performed by: PHYSICIAN ASSISTANT

## 2023-06-05 PROCEDURE — 83735 ASSAY OF MAGNESIUM: CPT | Performed by: PHYSICIAN ASSISTANT

## 2023-06-05 RX ORDER — AMLODIPINE BESYLATE 10 MG/1
10 TABLET ORAL DAILY
Qty: 30 TABLET | Refills: 11 | Status: SHIPPED | OUTPATIENT
Start: 2023-06-06 | End: 2024-06-05

## 2023-06-05 RX ORDER — ACETAMINOPHEN 500 MG
1000 TABLET ORAL DAILY PRN
COMMUNITY

## 2023-06-05 RX ORDER — LEVOTHYROXINE SODIUM 100 UG/1
100 TABLET ORAL
COMMUNITY

## 2023-06-05 RX ORDER — TORSEMIDE 20 MG/1
20 TABLET ORAL DAILY
Qty: 30 TABLET | Refills: 11 | Status: SHIPPED | OUTPATIENT
Start: 2023-06-06 | End: 2024-06-05

## 2023-06-05 RX ORDER — TORSEMIDE 20 MG/1
20 TABLET ORAL DAILY
Status: DISCONTINUED | OUTPATIENT
Start: 2023-06-05 | End: 2023-06-05 | Stop reason: HOSPADM

## 2023-06-05 RX ORDER — INSULIN DETEMIR 100 [IU]/ML
20 INJECTION, SOLUTION SUBCUTANEOUS DAILY
COMMUNITY

## 2023-06-05 RX ORDER — LOSARTAN POTASSIUM 50 MG/1
50 TABLET ORAL DAILY
Status: ON HOLD | COMMUNITY
End: 2023-06-05 | Stop reason: HOSPADM

## 2023-06-05 RX ADMIN — ASPIRIN 81 MG 81 MG: 81 TABLET ORAL at 08:06

## 2023-06-05 RX ADMIN — AMLODIPINE BESYLATE 10 MG: 10 TABLET ORAL at 08:06

## 2023-06-05 RX ADMIN — HEPARIN SODIUM 5000 UNITS: 5000 INJECTION INTRAVENOUS; SUBCUTANEOUS at 05:06

## 2023-06-05 RX ADMIN — HEPARIN SODIUM 5000 UNITS: 5000 INJECTION INTRAVENOUS; SUBCUTANEOUS at 02:06

## 2023-06-05 RX ADMIN — PREGABALIN 25 MG: 25 CAPSULE ORAL at 08:06

## 2023-06-05 RX ADMIN — POLYETHYLENE GLYCOL 3350 17 G: 17 POWDER, FOR SOLUTION ORAL at 08:06

## 2023-06-05 RX ADMIN — ATORVASTATIN CALCIUM 40 MG: 40 TABLET, FILM COATED ORAL at 08:06

## 2023-06-05 RX ADMIN — TORSEMIDE 20 MG: 20 TABLET ORAL at 11:06

## 2023-06-05 RX ADMIN — LEVOTHYROXINE SODIUM 100 MCG: 100 TABLET ORAL at 05:06

## 2023-06-05 NOTE — NURSING
Discharge instructions and education given to pt. Verbalized understanding. Removal of IV. No redness, swelling or drainage noted. Telemetry removed.  Pt escort pending awaiting family's arrival.

## 2023-06-05 NOTE — PLAN OF CARE
Problem: Adult Inpatient Plan of Care  Goal: Plan of Care Review  Outcome: Met  Goal: Patient-Specific Goal (Individualized)  Outcome: Met  Goal: Absence of Hospital-Acquired Illness or Injury  Outcome: Met  Goal: Optimal Comfort and Wellbeing  Outcome: Met  Goal: Readiness for Transition of Care  Outcome: Met     Problem: Diabetes Comorbidity  Goal: Blood Glucose Level Within Targeted Range  Outcome: Met     Problem: Fluid and Electrolyte Imbalance (Acute Kidney Injury/Impairment)  Goal: Fluid and Electrolyte Balance  Outcome: Met     Problem: Oral Intake Inadequate (Acute Kidney Injury/Impairment)  Goal: Optimal Nutrition Intake  Outcome: Met     Problem: Renal Function Impairment (Acute Kidney Injury/Impairment)  Goal: Effective Renal Function  Outcome: Met

## 2023-06-05 NOTE — PLAN OF CARE
[facility]  Initial Discharge Assessment       Primary Care Provider: Edgar Rhodes MD    Admission Diagnosis: Hypertension [I10]  Chest pain [R07.9]  Hypertensive emergency [I16.1]    Admission Date: 6/4/2023  Expected Discharge Date: 6/5/2023         Payor: [facility] MCARE / Plan: [facility] DUAL COMPLETE HMO SNP / Product Type: Medicare Advantage /     Extended Emergency Contact Information  Primary Emergency Contact: ENGLISH ALEKSANDR  Mobile Phone: [phone]  Relation: Sister    Discharge Plan A: (P) Home with family  Discharge Plan B: (P) Home with family      [id number] - [place], [place] - [address] AT [facility] [address]  [address]  [facility] [address]  Phone: [phone] Fax: [phone]    [facility] [place] [facility] [address]  [address]  [address]  [place] [address]  Phone: [phone] Fax: [phone]               SW completed Discharge Planning Assessment with patient via bedside. Discharge planning booklet given to patient/family and whiteboard updated with HAKAN and phone #. All questions answered.    Patient reported that her family will provide transportation upon discharge.     Patient reported that she lives at home with her daughter and sister. Patient reported that prior to hospitalization she was independent with her ADL's. Patient reported that she is not on dialysis and does not go to a Coumadin clinic.      Patient lives in a [facility] with 0 steps to enter.      Cinthya Servin LMSW  [facility]  Ext. [phone]

## 2023-06-05 NOTE — PHARMACY MED REC
"Admission Medication History     The home medication history was taken by Mya Bo.    You may go to "Admission" then "Reconcile Home Medications" tabs to review and/or act upon these items.     The home medication list has been updated by the Pharmacy department.   Please read ALL comments highlighted in yellow.   Please address this information as you see fit.    Feel free to contact us if you have any questions or require assistance.      The medications listed below were removed from the home medication list. Please reorder if appropriate:  Patient reports no longer taking the following medication(s):  AMLODIPINE 10 MG  GABAPENTIN 300 MG  HYDROCHLOROTHIAZIDE 25 MG  PRAVASTATIN 40 MG    Medications listed below were obtained from: Patient/family and Medications brought from home  PTA Medications   Medication Sig    acetaminophen (TYLENOL) 500 MG tablet Take 1,000 mg by mouth daily as needed (headache).    aspirin 81 MG Chew Take 81 mg by mouth once daily.    empagliflozin (JARDIANCE) 10 mg tablet Take 10 mg by mouth once daily.    insulin detemir U-100, Levemir, (LEVEMIR FLEXPEN) 100 unit/mL (3 mL) InPn pen Inject 20 Units into the skin once daily.    levothyroxine (SYNTHROID) 125 MCG tablet Take 125 mcg by mouth every morning. PT HAS BEEN TAKING 125 MCG. RX FOR 100MCG WAS FILLED ON 6-3-23 AND IS WAITING TO BE PICKED UP AT THE PHARMACY.    losartan (COZAAR) 50 MG tablet Take 50 mg by mouth once daily. Pt has 2 bottles of medication and she believes she has been taking double the amount recently.     Was instructed by MD at [facility] on 6/3/23 to stop taking Metformin and Losartan moving forward. Please  patient    metFORMIN (GLUCOPHAGE) 1000 MG tablet TAKE 1 TABLET(1000 MG) BY MOUTH DAILY WITH BREAKFAST Was instructed by MD at [facility] on 6/3/23 to stop taking Metformin and Losartan moving forward. Please  patient.     mirabegron (MYRBETRIQ) 50 mg Tb24 Take 1 tablet by mouth once daily.    pregabalin " "(LYRICA) 25 MG capsule Take 25 mg by mouth 2 (two) times daily.    rosuvastatin (CRESTOR) 20 MG tablet Take 20 mg by mouth once daily. Pt has 2 bottles of medication and she believes she has been taking double the amount recently    BD ULTRA-FINE MINI PEN NEEDLE 31 gauge x 3/16" Ndle USE AS DIRECTED TWICE DAILY    blood sugar diagnostic Strp Strips and lancets covered by insurance E11.9, check glucose twice daily    blood-glucose meter kit Check glucose daily E11.9 meter covered by insurance, check glucose twice daily    lancets (ACCU-CHEK SOFTCLIX LANCETS) Misc Check glucose twice daily    latanoprost 0.005 % ophthalmic solution PLACE 1 DROP INTO BOTH EYES AT NIGHT    psyllium (METAMUCIL) powder Take 1 packet by mouth once daily. Was taking daily. Stopped 2 weeks ago bc was making stomach cramp       Potential issues to be addressed PRIOR TO DISCHARGE    Please discuss with the patient barriers to adherence with medication treatment plans    Patient requires education regarding drug therapies     yMa Bo  EXT [id number]                  .        "

## 2023-06-05 NOTE — NURSING
Pt AAOX4. No distress noted. Pt ambulating in room. Bed in lowest position. Side rails up x2. Call bell and personal belongs within reach. Telemetry maintained. Safety precautions maintained. Pt free of falls or injuries. No further issues or concerns at this time. Plan of care continues.

## 2023-06-06 NOTE — PLAN OF CARE
[facility]  Discharge Final Note    Primary Care Provider: Edgar Rhodes MD    Expected Discharge Date: 6/5/2023    Patient discharged to home via personal transportation.     Patient's bedside nurse and patient notified of the above.      Final Discharge Note (most recent)       Final Note - 06/06/23 1050          Final Note    Assessment Type Final Discharge Note (P)      Anticipated Discharge Disposition Home or Self Care (P)         Post-Acute Status    Post-Acute Authorization Other (P)      Other Status No Post-Acute Service Needs (P)                      Important Message from Medicare             Contact [facility]   Specialty: Family Medicine    [address] [facility] [address]   Phone: [phone]       Next Steps: Follow up    Instructions: Message was sent for someone from Dr. Rhodes's office to call and schedule hospital follow up appointment; however, if you do not hear from someone within 48 hours contact the number listed.            No future appointments.    SW scheduled post-discharge follow-up appointment and information added to AVS.     Cinthya Servin LMSW  [facility]  Ext. [phone]

## 2023-06-06 NOTE — HOSPITAL COURSE
Marco Hagen is a 67 y.o. female admitted to observation for hypertensive emergency and AARTI. AARTI improved. Began amlodipine 10 mg and torsemide 20 mg with good blood pressure control. Headache resolved. Patient is medically ready for discharge. Extensive education given on patient's medications including her new ones. Stressed importance of monitoring BP at home and getting a pill pack to make sure she is taking the correct medications/ doses each day. Patient voiced understanding. PCP follow-up appt message sent.

## 2023-06-06 NOTE — DISCHARGE SUMMARY
[facility] [address] [place] [address] Medicine  Discharge Summary      Patient Name: Marco Hagen  MRN: [id number]  SOHAN: [id number]  Patient Class: OP- Observation  Admission Date: 6/4/2023  Hospital Length of Stay: 0 days  Discharge Date and Time: 6/5/2023  5:43 PM  Attending Physician: Natalie att. providers found   Discharging Provider: Nanette Pineda PA-C  Primary Care Provider: Edgar Rhodes MD  [facility] Medicine Team: [facility] Y Nanette Pineda PA-C  Primary Care Team: [facility]    HPI:   Marco Hagen is a 67 y.o. female with a hx of hypothyroidism, HTN, DM, and CKD presents to the ED for headache and multiple other complaints. Of note, pt was called by her PCP about an AARTI noted on exam and told to come to the ED for evaluation. Patient states she has had a headache from about 2 months that is pressure-like in the occipital region with associated photophobia. Pain is currently a 9/10. Has been compliant with all of her BP meds and was recently told by her physician to stop taking losartan. Unable to verbalize the reasoning. She is not able to tell me the names of most of the medications she is taking. Also endorses back and abdominal and states that she is unsure when she has had her last bowel movement and has struggled with constipation for some time. Denies any decreased PO intake. Denies chest pain, SOB, lightheadedness, dizziness, diarrhea, n/v, and urinary symptoms.     In the ED, /120 with improvement after labetolol injection 10 mg X 2. Hgb 10.7. Cr 1.8. UA not infectious. CTH showed no significant change from prior as detailed above specifically without evidence for acute intracranial hemorrhage or sulcal effacement to suggest large territory recent infarction. Continued partially empty sella in appropriate clinical setting intracranial hypertension to be considered in differential. Admitted to hospital medicine.      * No surgery found *      Hospital Course:   Marco Hagen  is a 67 y.o. female admitted to observation for hypertensive emergency and AARTI. AARTI improved. Began amlodipine 10 mg and torsemide 20 mg with good blood pressure control. Headache resolved. Patient is medically ready for discharge. Extensive education given on patient's medications including her new ones. Stressed importance of monitoring BP at home and getting a pill pack to make sure she is taking the correct medications/ doses each day. Patient voiced understanding. PCP follow-up appt message sent.        Goals of Care Treatment Preferences:  Code Status: Full Code      Consults:     No new Assessment & Plan notes have been filed under this hospital service since the last note was generated.  Service: Hospital Medicine    Final Active Diagnoses:    Diagnosis Date Noted POA    PRINCIPAL PROBLEM:  Hypertensive emergency [I16.1] 06/04/2023 Yes    Hypertension associated with stage 4 chronic kidney disease due to type 2 diabetes mellitus [E11.22, I12.9, N18.4] 06/04/2023 Yes    Acute kidney injury superimposed on chronic kidney disease [N17.9, N18.9] 06/04/2023 Yes    Constipation [K59.00] 06/04/2023 Yes    Headache [R51.9] 06/04/2023 Yes    Essential hypertension [I10] 11/15/2018 Yes    Dyslipidemia [E78.5] 09/28/2018 Yes    Anemia [D64.9] 08/23/2013 Yes    Hypothyroidism following radioiodine therapy [E89.0] 10/24/2012 Yes      Problems Resolved During this Admission:       Discharged Condition: stable    Disposition: Home or Self Care    Follow Up:   Follow-up Information     [facility] - Family Medicine Follow up.    Specialty: Family Medicine  Why: Message was sent for someone from Dr. Rhodes's office to call and schedule hospital follow up appointment; however, if you do not hear from someone within 48 hours contact the number listed.  Contact information:  [address] [place] [id number]  [phone]                       Patient Instructions:      Activity as tolerated  "        Pending Diagnostic Studies:     None         Medications:  Reconciled Home Medications:      Medication List      START taking these medications    torsemide 20 MG Tab  Commonly known as: DEMADEX  Take 1 tablet (20 mg total) by mouth once daily.        CHANGE how you take these medications    amLODIPine 10 MG tablet  Commonly known as: NORVASC  Take 1 tablet (10 mg total) by mouth once daily.  What changed: when to take this        CONTINUE taking these medications    acetaminophen 500 MG tablet  Commonly known as: TYLENOL  Take 1,000 mg by mouth daily as needed (headache).     aspirin 81 MG Chew  One daily     BD ULTRA-FINE MINI PEN NEEDLE 31 gauge x 3/16" Ndle  Generic drug: pen needle, diabetic  USE AS DIRECTED TWICE DAILY     blood sugar diagnostic Strp  Strips and lancets covered by insurance E11.9, check glucose twice daily     blood-glucose meter kit  Check glucose daily E11.9 meter covered by insurance, check glucose twice daily     JARDIANCE 10 mg tablet  Generic drug: empagliflozin  Take 10 mg by mouth once daily.     lancets Misc  Commonly known as: ACCU-CHEK SOFTCLIX LANCETS  Check glucose twice daily     latanoprost 0.005 % ophthalmic solution  PLACE 1 DROP INTO BOTH EYES AT NIGHT     LEVEMIR FLEXPEN 100 unit/mL (3 mL) Inpn pen  Generic drug: insulin detemir U-100 (Levemir)  Inject 20 Units into the skin once daily.     levothyroxine 100 MCG tablet  Commonly known as: SYNTHROID  Take 100 mcg by mouth before breakfast.     MYRBETRIQ 50 mg Tb24  Generic drug: mirabegron  Take 1 tablet by mouth once daily.     pregabalin 25 MG capsule  Commonly known as: LYRICA  Take 25 mg by mouth 2 (two) times daily.     rosuvastatin 20 MG tablet  Commonly known as: CRESTOR  Take 20 mg by mouth once daily.        STOP taking these medications    losartan 50 MG tablet  Commonly known as: COZAAR     metFORMIN 1000 MG tablet  Commonly known as: GLUCOPHAGE     psyllium powder  Commonly known as: METAMUCIL      "       Indwelling Lines/Drains at time of discharge:   Lines/Drains/Airways     None                 Time spent on the discharge of patient: 36 minutes         Nanette Pineda PA-C  Department of Hospital Medicine  [facility]

## 2023-08-04 ENCOUNTER — HOSPITAL ENCOUNTER (EMERGENCY)
Facility: HOSPITAL | Age: 68
Discharge: HOME | End: 2023-08-04
Attending: EMERGENCY MEDICINE
Payer: MEDICARE

## 2023-08-04 VITALS
TEMPERATURE: 98 F | RESPIRATION RATE: 18 BRPM | BODY MASS INDEX: 30.22 KG/M2 | DIASTOLIC BLOOD PRESSURE: 85 MMHG | SYSTOLIC BLOOD PRESSURE: 193 MMHG | WEIGHT: 177 LBS | OXYGEN SATURATION: 99 % | HEART RATE: 60 BPM | HEIGHT: 64 IN

## 2023-08-04 DIAGNOSIS — S20.212A RIB CONTUSION, LEFT, INITIAL ENCOUNTER: ICD-10-CM

## 2023-08-04 DIAGNOSIS — S09.90XA INJURY OF HEAD, INITIAL ENCOUNTER: Primary | ICD-10-CM

## 2023-08-04 PROCEDURE — 99284 EMERGENCY DEPT VISIT MOD MDM: CPT | Mod: 25

## 2023-08-04 PROCEDURE — 25000003 PHARM REV CODE 250: Performed by: EMERGENCY MEDICINE

## 2023-08-04 RX ORDER — ACETAMINOPHEN 325 MG/1
650 TABLET ORAL
Status: COMPLETED | OUTPATIENT
Start: 2023-08-04 | End: 2023-08-04

## 2023-08-04 RX ADMIN — ACETAMINOPHEN 650 MG: 325 TABLET ORAL at 08:08

## 2023-08-22 ENCOUNTER — HOSPITAL ENCOUNTER (EMERGENCY)
Facility: HOSPITAL | Age: 68
Discharge: HOME | End: 2023-08-22
Attending: EMERGENCY MEDICINE
Payer: MEDICARE

## 2023-08-22 VITALS
HEIGHT: 64 IN | RESPIRATION RATE: 17 BRPM | SYSTOLIC BLOOD PRESSURE: 158 MMHG | DIASTOLIC BLOOD PRESSURE: 79 MMHG | TEMPERATURE: 98 F | HEART RATE: 86 BPM | OXYGEN SATURATION: 100 % | WEIGHT: 177 LBS | BODY MASS INDEX: 30.22 KG/M2

## 2023-08-22 DIAGNOSIS — K56.41 FECAL IMPACTION IN RECTUM: Primary | ICD-10-CM

## 2023-08-22 PROCEDURE — 99283 EMERGENCY DEPT VISIT LOW MDM: CPT

## 2023-08-22 PROCEDURE — 25000003 PHARM REV CODE 250: Performed by: EMERGENCY MEDICINE

## 2023-08-22 RX ORDER — ACETAMINOPHEN 500 MG
1000 TABLET ORAL
Status: COMPLETED | OUTPATIENT
Start: 2023-08-22 | End: 2023-08-22

## 2023-08-22 RX ADMIN — ACETAMINOPHEN 1000 MG: 500 TABLET ORAL at 07:08

## 2023-08-22 RX ADMIN — ENEMA 1 ENEMA: 19; 7 ENEMA RECTAL at 08:08

## 2023-08-22 NOTE — Clinical Note
For constipation:  1.  Stay well hydrated at home as this will help keep the stool soft and easier to pass  2. Start taking miralax (see package dosing) 2-3 times daily until you are having soft daily stools.  3. If this is not working after 2 days,  you can add docusate 100mg daily.  4.

## 2023-08-22 NOTE — ED TRIAGE NOTES
Pt presents to ED 1 week post op after thyroid sx. Pt c/o constipation (no bowel movement x5 days), and hemorrhoid flare up. Pt c/o 10/10 rectal pain.

## 2023-08-23 NOTE — DISCHARGE INSTRUCTIONS
For constipation:  1.  Stay well hydrated at home as this will help keep the stool soft and easier to pass  2. Start taking miralax (see package dosing) 2-3 times daily until you are having soft daily stools.  3. If this is not working after 2 days, you can add docusate 100mg daily.  4. If this is not helping, please speak with your primary doctor. You can try 1-2 doses of milk of magnesium but be careful to use further doses as you have kidney disease and this could result in abnormally high level of magnesium in your blood.  5. If you have severe abdominal pain, vomiting and inability to keep down food or water, fevers >100.6, or other worsening concerning symptoms, please return to the emergency department.

## 2023-08-23 NOTE — ED NOTES
Took report from Jignesh LEE, and assumed care of pt at this time. Pt resting comfortably and independently repositioned in stretcher with bed locked in lowest position for safety. NAD noted at this time. Respirations even and unlabored and visible chest rise noted.  Patient offered bathroom assistance and denies need at this time. Pt instructed to call if assistance is needed. Call light within reach. Family at bedside. Pt complaining of headache 9/10 MD notified. Will continue to monitor.

## 2023-08-23 NOTE — ED PROVIDER NOTES
Encounter Date: 8/22/2023       History     Chief Complaint   Patient presents with    Fecal Impaction     Thyroid surg last week,      HPI  67-year-old female with a history of type 2 diabetes, hypertension, hypothyroidism who presents with constipation.  She reports that she would a procedure on her thyroid last week.  She has not had any swelling, discharge, drainage, pain, fevers or chills.  She is here because she is not had a good bowel movement in about 5 days.  She does have a history of constipation.  She does report she is passing gas.  She denies any nausea or vomiting.  No abdominal pain or distention.  She feels like there is a stool ball that is trying to come out when she bears down, just will not come out.  No black or bloody stools.  No history of prior bowel obstructions.  Reports she feels well otherwise.      Review of patient's allergies indicates:   Allergen Reactions    Lisinopril Swelling     angioedema     Past Medical History:   Diagnosis Date    Diabetes mellitus, type 2     with neuropathy    Hypertension     Hypothyroidism      Past Surgical History:   Procedure Laterality Date    BREAST CYST EXCISION      CHOLECYSTECTOMY      HYSTERECTOMY      karen - not for cancer    THYROID SURGERY       Family History   Problem Relation Age of Onset    Cancer Mother         renal and colon    Stroke Mother     Heart disease Father     Diabetes Sister     Hypertension Sister     Hypertension Sister     Hyperlipidemia Sister     Hypertension Sister     Melanoma Neg Hx      Social History     Tobacco Use    Smoking status: Never     Passive exposure: Never    Smokeless tobacco: Never   Substance Use Topics    Alcohol use: No    Drug use: Never     Review of Systems   Constitutional:  Negative for fever.   HENT:  Negative for sore throat.    Respiratory:  Negative for shortness of breath.    Cardiovascular:  Negative for chest pain.   Gastrointestinal:  Positive for constipation and rectal pain. Negative  for nausea.   Genitourinary:  Negative for dysuria.   Musculoskeletal:  Negative for back pain.   Skin:  Negative for rash.   Neurological:  Negative for weakness.   Hematological:  Does not bruise/bleed easily.   All other systems reviewed and are negative.      Physical Exam     Initial Vitals [08/22/23 1716]   BP Pulse Resp Temp SpO2   (!) 166/79 92 18 99.9 °F (37.7 °C) 99 %      MAP       --         Physical Exam    Nursing note and vitals reviewed.  Constitutional: She appears well-developed and well-nourished. No distress.   HENT:   Head: Normocephalic and atraumatic.   Nose: Nose normal.   Eyes: Conjunctivae and EOM are normal. Pupils are equal, round, and reactive to light.   Neck: Neck supple.   Drain in place.  No neck swelling.  No discharge.  No erythema.  No tenderness.   Normal range of motion.  Cardiovascular:  Normal rate, regular rhythm and normal heart sounds.           Pulmonary/Chest: Breath sounds normal. No respiratory distress. She has no wheezes. She has no rhonchi. She has no rales.   Abdominal: Abdomen is soft. Bowel sounds are normal. She exhibits no distension and no mass. There is no abdominal tenderness. There is no rebound and no guarding.   Genitourinary:    Genitourinary Comments: Small nonthrombosed hemorrhoid. Soft large stool ball in the rectum. No blood. No tenderness     Musculoskeletal:         General: Normal range of motion.      Cervical back: Normal range of motion and neck supple.     Neurological: She is alert and oriented to person, place, and time. She has normal strength.   Skin: Skin is warm and dry. Capillary refill takes less than 2 seconds. No erythema. No pallor.   Psychiatric: She has a normal mood and affect.         ED Course   Procedures  Labs Reviewed - No data to display       Imaging Results    None          Medications   acetaminophen tablet 1,000 mg (1,000 mg Oral Given 8/22/23 1937)   sodium phosphates 19-7 gram/118 mL enema 1 enema (1 enema Rectal Given  8/22/23 2030)     Medical Decision Making  67-year-old female who had a recent surgery who admits to not eating and drinking as much as she usually does not take her MiraLax presents with a fecal impaction in her rectum.  She reports she overall has been feeling well, just feels like every time she tries to have a bowel movement, a stool ball comes to the rectum and she can not pass it.  No abdominal pain or vomiting.  No distention.  On exam, has normal bowel sounds and a reassuring abdominal exam which make me have a very low suspicion for any sort of obstructive process.  Her initial temperature was 99.9 but her repeat came down quickly on its own and she does not have any infectious symptoms.  Her surgical wound is very reassuring concerning for infection.  Again, overall symptoms and exam suspicious for fecal impaction.  I did do a bedside disimpaction which revealed soft stool with a large stool ball.  I was able to get quite a significant amount out.  We tried an enema with no relief so I did a further disimpaction.  I did not get everything out however the patient would like to go home at this time which I think is appropriate.  We will trial her on some medications for constipation as well as over-the-counter enemas to try at home.  She will follow up with her primary doctor as needed.    Risk  OTC drugs.                               Clinical Impression:   Final diagnoses:  [K56.41] Fecal impaction in rectum (Primary)               Celine Brandon MD  08/22/23 2043

## 2023-08-24 ENCOUNTER — HOSPITAL ENCOUNTER (EMERGENCY)
Facility: HOSPITAL | Age: 68
Discharge: HOME | End: 2023-08-24
Attending: STUDENT IN AN ORGANIZED HEALTH CARE EDUCATION/TRAINING PROGRAM
Payer: MEDICARE

## 2023-08-24 VITALS
TEMPERATURE: 98 F | RESPIRATION RATE: 20 BRPM | OXYGEN SATURATION: 99 % | DIASTOLIC BLOOD PRESSURE: 88 MMHG | HEART RATE: 70 BPM | HEIGHT: 64 IN | WEIGHT: 177 LBS | BODY MASS INDEX: 30.22 KG/M2 | SYSTOLIC BLOOD PRESSURE: 184 MMHG

## 2023-08-24 DIAGNOSIS — K62.89 RECTAL PAIN: Primary | ICD-10-CM

## 2023-08-24 DIAGNOSIS — K64.9 HEMORRHOIDS, UNSPECIFIED HEMORRHOID TYPE: ICD-10-CM

## 2023-08-24 DIAGNOSIS — K56.41 FECAL IMPACTION: ICD-10-CM

## 2023-08-24 PROCEDURE — 99283 EMERGENCY DEPT VISIT LOW MDM: CPT

## 2023-08-24 PROCEDURE — 25000003 PHARM REV CODE 250: Performed by: PHYSICIAN ASSISTANT

## 2023-08-24 RX ORDER — PSEUDOEPHEDRINE/ACETAMINOPHEN 30MG-500MG
100 TABLET ORAL
Status: COMPLETED | OUTPATIENT
Start: 2023-08-24 | End: 2023-08-24

## 2023-08-24 RX ORDER — LIDOCAINE HYDROCHLORIDE 20 MG/ML
JELLY TOPICAL
Status: COMPLETED | OUTPATIENT
Start: 2023-08-24 | End: 2023-08-24

## 2023-08-24 RX ORDER — TORSEMIDE 20 MG/1
20 TABLET ORAL
Status: COMPLETED | OUTPATIENT
Start: 2023-08-24 | End: 2023-08-24

## 2023-08-24 RX ORDER — HYDROCORTISONE 25 MG/G
CREAM TOPICAL 2 TIMES DAILY
Qty: 28 G | Refills: 0 | Status: SHIPPED | OUTPATIENT
Start: 2023-08-24

## 2023-08-24 RX ORDER — AMLODIPINE BESYLATE 10 MG/1
10 TABLET ORAL
Status: COMPLETED | OUTPATIENT
Start: 2023-08-24 | End: 2023-08-24

## 2023-08-24 RX ADMIN — Medication 100 ML: at 02:08

## 2023-08-24 RX ADMIN — TORSEMIDE 20 MG: 20 TABLET ORAL at 04:08

## 2023-08-24 RX ADMIN — SODIUM CHLORIDE 500 ML: 9 INJECTION, SOLUTION INTRAVENOUS at 02:08

## 2023-08-24 RX ADMIN — AMLODIPINE BESYLATE 10 MG: 10 TABLET ORAL at 04:08

## 2023-08-24 RX ADMIN — LIDOCAINE HYDROCHLORIDE 10 ML: 20 JELLY TOPICAL at 02:08

## 2023-08-24 NOTE — ED NOTES
Patient identifiers verified and correct for Maureenstephane Hieu   LOC: The patient is awake, alert and aware of environment with an appropriate affect, the patient is oriented x 3 and speaking appropriately.   APPEARANCE: Patient appears comfortable and in no acute distress  SKIN: The skin is warm and dry, color consistent with ethnicity, patient has normal skin turgor and moist mucus membranes, skin intact  MUSCULOSKELETAL: Patient moving all extremities spontaneously  RESPIRATORY: Airway is open and patent, respirations are spontaneous, patient has a normal effort and rate, no accessory muscle use noted, O2 sats noted at 100% on room air.  CARDIAC:  Patient has a normal rate and regular rhythm, no edema noted, capillary refill < 3 seconds.   GASTRO: Soft and non tender to palpation, no distention noted, normoactive bowel sounds present in all four quadrants. Pt c/o rectal pain with hemorrhoids     : Pt denies any pain or frequency with urination.  NEURO: Pt opens eyes spontaneously, behavior appropriate to situation, follows commands, facial expression symmetrical, bilateral hand grasp equal and even, purposeful motor response noted, normal sensation in all extremities when touched with a finger.

## 2023-08-24 NOTE — ED NOTES
PA at bedside for rectal exam, perianal area cleansed, applied Z paste and brief, given pads, wipes, supplies for home use.

## 2023-08-24 NOTE — ED PROVIDER NOTES
Encounter Date: 8/24/2023       History     Chief Complaint   Patient presents with    Hemorrhoids     No bleeding , states had fecal impaction recently, having pain     67-year-old female with DM type 2, hypertension, hypothyroidism, neuropathy presents to the ED rectal pain.  Patient seen in the ED 2 days ago for fecal impaction.  Patient states that she has persistent discomfort in her rectum as well as hemorrhoid.  She denies history of hemorrhoids.  She denies rectal bleeding.  She has been taking a laxative to have a bowel movement, but she is not sure of the name.  MiraLax?  Patient states that she is passing only a small amount of liquid stool and has some leakage of stool as well.       Review of patient's allergies indicates:   Allergen Reactions    Lisinopril Swelling     angioedema     Past Medical History:   Diagnosis Date    Diabetes mellitus, type 2     with neuropathy    Hypertension     Hypothyroidism      Past Surgical History:   Procedure Laterality Date    BREAST CYST EXCISION      CHOLECYSTECTOMY      HYSTERECTOMY      karen - not for cancer    THYROID SURGERY       Family History   Problem Relation Age of Onset    Cancer Mother         renal and colon    Stroke Mother     Heart disease Father     Diabetes Sister     Hypertension Sister     Hypertension Sister     Hyperlipidemia Sister     Hypertension Sister     Melanoma Neg Hx      Social History     Tobacco Use    Smoking status: Never     Passive exposure: Never    Smokeless tobacco: Never   Substance Use Topics    Alcohol use: No    Drug use: Never     Review of Systems   Gastrointestinal:  Positive for rectal pain. Negative for anal bleeding.       Physical Exam     Initial Vitals [08/24/23 1134]   BP Pulse Resp Temp SpO2   (!) 174/81 81 18 99 °F (37.2 °C) 100 %      MAP       --         Physical Exam    Nursing note and vitals reviewed.  Constitutional: She appears well-developed and well-nourished. She is not diaphoretic.  Non-toxic  appearance. She does not appear ill. No distress.   HENT:   Head: Normocephalic and atraumatic.   Neck: Neck supple.   Cardiovascular:  Normal rate and regular rhythm.     Exam reveals no gallop and no friction rub.       No murmur heard.  Pulmonary/Chest: Effort normal and breath sounds normal. No accessory muscle usage. No tachypnea. No respiratory distress. She has no decreased breath sounds. She has no wheezes. She has no rhonchi. She has no rales.   Abdominal: She exhibits no distension.   Genitourinary:    Genitourinary Comments: Small external mildly tender nonthrombosed hemorrhoid.  Patient was only able to tolerate minimal FLAVIO and refused full or examination.  There was large amount of soft stool in the rectal vault.  Various leakage of liquid stool from the anus.  No blood noted.      Musculoskeletal:      Cervical back: Neck supple.     Neurological: She is alert.   Skin: No rash noted.   Psychiatric: She has a normal mood and affect. Her behavior is normal.         ED Course   Procedures  Labs Reviewed - No data to display       Imaging Results    None          Medications   LIDOcaine HCl 2% urojet (10 mLs Mucous Membrane Given 8/24/23 1410)   glycerin 99.5% topical solution 100 mL (100 mLs Rectal Given 8/24/23 1410)     And   sodium chloride 0.9% bolus 500 mL 500 mL (0 mLs Rectal Stopped 8/24/23 1548)   amLODIPine tablet 10 mg (10 mg Oral Given 8/24/23 1652)   torsemide tablet 20 mg (20 mg Oral Given 8/24/23 1652)     Medical Decision Making  67-year-old female presents to the ED with rectal pain.  She is hypertensive, but did not take her antihypertensive medications today.  Vitals otherwise within normal limits.  See physical exam details above.    Patient received enema in the ED and reported passing a large amount of stool with symptomatic improvement.  I feel that she can be discharged in stable condition.  Will provide Anusol for rectal discomfort.  Patient also counseled on Sitz baths.  Have  placed a referral to [facility] for further evaluation if she continues to have rectal pain, though I feel her symptoms are likely related to fecal impaction and or hemorrhoids.  ED return precautions given.I have reviewed the patient's records and discussed this case with my supervising physician.         Risk  OTC drugs.  Prescription drug management.                               Clinical Impression:   Final diagnoses:  [K62.89] Rectal pain (Primary)  [K56.41] Fecal impaction  [K64.9] Hemorrhoids, unspecified hemorrhoid type        ED Disposition Condition    Discharge Stable          ED Prescriptions       Medication Sig Dispense Start Date End Date Auth. Provider    hydrocortisone (ANUSOL-HC) 2.5 % rectal cream Place rectally 2 (two) times daily. 28 g 8/24/2023 -- Sasha Bonilla PA-C          Follow-up Information       Follow up With Specialties Details Why Contact Info Additional Information    [facility]   [address]  [facility] [address]  [phone] [facility] 4th Floor Please park in [facility] and use [facility] elevator             Sasha Bonilla PA-C  08/25/23 0932

## 2023-08-24 NOTE — DISCHARGE INSTRUCTIONS
Use your hemorrhoid cream as directed.  Read the attachment on Sitz baths for pain relief from your hemorrhoids.  Follow-up in colorectal clinic if you continue to have rectal pain.    Return to the ER for any severe pain, bleeding from her rectum, fever greater than 100.4° or any other worrisome symptoms.

## 2023-08-24 NOTE — ED NOTES
Patient observed standing in room, states she had to go to the bathroom so she went in the diaper, instructed where bathroom is located.

## 2023-08-24 NOTE — ED TRIAGE NOTES
Pt arriving to ED c/o rectal pain. Hx of hemorrhoids, No bleeding , states had fecal impaction recently

## 2023-09-04 PROBLEM — N18.9 ACUTE KIDNEY INJURY SUPERIMPOSED ON CHRONIC KIDNEY DISEASE: Status: RESOLVED | Noted: 2023-06-04 | Resolved: 2023-09-04

## 2023-09-04 PROBLEM — N17.9 ACUTE KIDNEY INJURY SUPERIMPOSED ON CHRONIC KIDNEY DISEASE: Status: RESOLVED | Noted: 2023-06-04 | Resolved: 2023-09-04

## 2024-04-15 ENCOUNTER — HOSPITAL ENCOUNTER (EMERGENCY)
Facility: HOSPITAL | Age: 69
Discharge: HOME | End: 2024-04-15
Attending: EMERGENCY MEDICINE
Payer: MEDICARE

## 2024-04-15 VITALS
BODY MASS INDEX: 29.88 KG/M2 | HEART RATE: 84 BPM | SYSTOLIC BLOOD PRESSURE: 166 MMHG | WEIGHT: 175 LBS | DIASTOLIC BLOOD PRESSURE: 79 MMHG | RESPIRATION RATE: 18 BRPM | OXYGEN SATURATION: 98 % | TEMPERATURE: 98 F | HEIGHT: 64 IN

## 2024-04-15 DIAGNOSIS — M79.604 ACUTE LEG PAIN, RIGHT: Primary | ICD-10-CM

## 2024-04-15 DIAGNOSIS — M79.89 LEG SWELLING: ICD-10-CM

## 2024-04-15 PROCEDURE — 25000003 PHARM REV CODE 250

## 2024-04-15 PROCEDURE — 99284 EMERGENCY DEPT VISIT MOD MDM: CPT | Mod: 25

## 2024-04-15 RX ORDER — LIDOCAINE 50 MG/G
1 PATCH TOPICAL
Status: DISCONTINUED | OUTPATIENT
Start: 2024-04-15 | End: 2024-04-15 | Stop reason: HOSPADM

## 2024-04-15 RX ADMIN — LIDOCAINE 1 PATCH: 50 PATCH CUTANEOUS at 06:04

## 2024-04-15 NOTE — DISCHARGE INSTRUCTIONS
Ms. Hagen,     Thank you for letting me care for you today! It was nice meeting you, and I hope you feel better soon.   If you would like access to your chart and what was done today please utilize the [facility].   Please don't hesitate to return if your symptoms worsen or you develop any other worrisome symptoms.    Our goal in the emergency department is to always give you outstanding care and exceptional service. You may receive a survey by mail or e-mail in the next week regarding your experience in our ED. We would greatly appreciate you completing and returning the survey. Your feedback provides us with a way to recognize our staff who give very good care and it helps us learn how to improve when your experience was below our aspiration of excellence.     Sincerely,    Korina Castillo PA-C  Emergency Department Physician Assistant  [facility] [place]          no chest pain and no edema.

## 2024-04-15 NOTE — ED TRIAGE NOTES
Pt arrived with c/o R leg swelling and pain x 1 month.  Pt denies any discoloration or warmth to RLE.  Pt ambulatory with walker at baseline and presently.  Pt endorses numbness and tingling to RLE, however pt has hx of neuropathy per chart review.  Denies any recent falls, trauma, or heavy lifting.  Denies any CP or SOB.  Denies any hx of blood clots.  Pt answering questions appropriately, speaking in complete sentences, respirations even and unlabored.  Aao x 4.

## 2024-04-16 NOTE — ED PROVIDER NOTES
Encounter Date: 4/15/2024       History     Chief Complaint   Patient presents with    Leg Swelling     Pt reports to ED with c/o RLE swelling and pain x1 month.      68-year-old female past medical history of hypertension, type 2 diabetes, hypothyroidism presents to the ED with  leg pain x 1 month.  Patient notes mild swelling of her right lower leg. Also notes pain to her right knee. Patient ambulates with the assistance of a walker.  No numbness or tingling.  No recent fall or injuries. Denies increased warmth or redness to the leg.  Denies history of DVTs.  No fever, nausea, vomiting, chest pain, shortness breath, abdominal pain.  No other acute complaints today.    The history is provided by the patient.     Review of patient's allergies indicates:   Allergen Reactions    Lisinopril Swelling     angioedema     Past Medical History:   Diagnosis Date    Diabetes mellitus, type 2     with neuropathy    Hypertension     Hypothyroidism      Past Surgical History:   Procedure Laterality Date    BREAST CYST EXCISION      CHOLECYSTECTOMY      HYSTERECTOMY      karen - not for cancer    THYROID SURGERY       Family History   Problem Relation Name Age of Onset    Cancer Mother          renal and colon    Stroke Mother      Heart disease Father      Diabetes Sister      Hypertension Sister      Hypertension Sister      Hyperlipidemia Sister      Hypertension Sister      Melanoma Neg Hx       Social History     Tobacco Use    Smoking status: Never     Passive exposure: Never    Smokeless tobacco: Never   Substance Use Topics    Alcohol use: No    Drug use: Never     Review of Systems   Constitutional:  Negative for chills and fever.   HENT:  Negative for congestion.    Respiratory:  Negative for cough and wheezing.    Cardiovascular:  Negative for chest pain and leg swelling.   Gastrointestinal:  Negative for abdominal distention, abdominal pain, nausea and vomiting.   Genitourinary:  Negative for dysuria and frequency.    Musculoskeletal:  Positive for arthralgias and gait problem. Negative for back pain, joint swelling, neck pain and neck stiffness.       Physical Exam     Initial Vitals [04/15/24 1637]   BP Pulse Resp Temp SpO2   (!) 166/79 84 18 98.2 °F (36.8 °C) 98 %      MAP       --         Physical Exam    Vitals reviewed.  Constitutional: She appears well-developed and well-nourished. She is not diaphoretic. No distress.   HENT:   Head: Normocephalic and atraumatic.   Right Ear: External ear normal.   Left Ear: External ear normal.   Mouth/Throat: Oropharynx is clear and moist.   Eyes: EOM are normal. Pupils are equal, round, and reactive to light.   Neck: Neck supple.   Normal range of motion.  Cardiovascular:  Normal rate and normal heart sounds.           Pulmonary/Chest: Breath sounds normal. No respiratory distress. She has no wheezes.   Abdominal: Abdomen is soft. Bowel sounds are normal. She exhibits no distension. There is no abdominal tenderness.   Musculoskeletal:         General: Tenderness present.      Cervical back: Normal range of motion and neck supple.      Comments: There is tenderness with palpation of the right lower leg. No overlying skin changes. No swelling of the right lower extremities when compared to the left. No pitting edema noted. Negative Homans sign. No obvious or visible bony deformities of the right extremity or knee. No joint effusion.     Neurological: She is alert and oriented to person, place, and time. GCS score is 15. GCS eye subscore is 4. GCS verbal subscore is 5. GCS motor subscore is 6.   Skin: Skin is warm. Capillary refill takes less than 2 seconds. No rash noted. No erythema.   Psychiatric: She has a normal mood and affect. Her behavior is normal. Judgment and thought content normal.         ED Course   Procedures  Labs Reviewed - No data to display       Imaging Results              US Lower Extremity Veins Right (Final result)  Result time 04/15/24 18:05:35      Final result  by Tripp Salas MD (04/15/24 18:05:35)                   Impression:      No evidence of deep venous thrombosis in the right lower extremity.      Electronically signed by: Tripp Salas  Date:    04/15/2024  Time:    18:05               Narrative:    EXAMINATION:  US LOWER EXTREMITY VEINS RIGHT    CLINICAL HISTORY:  Pain in right leg    TECHNIQUE:  Duplex and color flow Doppler evaluation and graded compression of the right lower extremity veins was performed.    COMPARISON:  None    FINDINGS:  Right thigh veins: The common femoral, femoral, popliteal, upper greater saphenous, and deep femoral veins are patent and free of thrombus. The veins are normally compressible and have normal phasic flow and augmentation response.    Right calf veins: The visualized calf veins are patent.    Contralateral CFV: The contralateral (left) common femoral vein is patent and free of thrombus.    Miscellaneous: None                                       Medications - No data to display  Medical Decision Making  Differential Diagnosis includes, but is not limited to:  Fracture, dislocation, compartment syndrome, nerve injury/palsy, vascular injury, DVT, rhabdomyolysis, hemarthrosis, septic joint, cellulitis, bursitis, muscle strain, ligament tear/sprain, laceration, foreign body, abrasion, soft tissue contusion, osteoarthritis.      ED management     68-year-old female past medical history of hypertension, type 2 diabetes, hypothyroidism presents to the ED with right lower leg pain x 1 month. Patient is not toxic appearing, hemodynamically stable and resting comfortably on bed. Patient is well-appearing.  Awake and alert.  Afebrile with vitals WNL. No distress on exam. Patient presenting with right leg pain.  Presentation concerning for DVT.  Also considered cellulitis, abscess, lymphedema, superficial thrombophlebitis, however these were not consistent with presentation.   Extremity ultrasound was obtained showing no  evidence of DVT.  This finding was discussed with the patient.  At this time,will treat suspected musculoskeletal pain with symptomatic care.  I advised elevation above the heart. I have instructed the patient to return to the ER at any time if there are any new or worsening symptoms including uncontrolled pain, fever, shortness of breath, chest pain.  The patient expressed understanding of and agreement with this plan.      I have discussed the specifics of the workup with the patient and the patient has verbalized understanding of the details of the workup, the diagnosis, the treatment plan, and the need for outpatient follow-up with PCP. ED precautions given. Discussed with pt about returning to the ED, if symptoms fail to improve or worsen.     RESULTS:  Documented in ED course. Labs/ekg interpreted by myself                Amount and/or Complexity of Data Reviewed  Radiology:  Decision-making details documented in ED Course.               ED Course as of 04/16/24 1209   Mon Apr 15, 2024   1809 US Lower Extremity Veins Right  I agree with the radiologist US interpretations.   FINDINGS:  Right thigh veins: The common femoral, femoral, popliteal, upper greater saphenous, and deep femoral veins are patent and free of thrombus. The veins are normally compressible and have normal phasic flow and augmentation response.     Right calf veins: The visualized calf veins are patent.     Contralateral CFV: The contralateral (left) common femoral vein is patent and free of thrombus.     Miscellaneous: None     Impression:     No evidence of deep venous thrombosis in the right lower extremity.   [NW]   1810 BP(!): 166/79 [NW]   1810 Temp: 98.2 °F (36.8 °C) [NW]   1810 Pulse: 84 [NW]   1810 Resp: 18 [NW]   1810 SpO2: 98 % [NW]      ED Course User Index  [NW] Korina Castillo PA-C                           Clinical Impression:  Final diagnoses:  [M79.89] Leg swelling  [M79.604] Acute leg pain, right  [M54.31] Sciatica of right  side (Primary)          ED Disposition Condition    Discharge Stable          ED Prescriptions    None       Follow-up Information       Follow up With Specialties Details Why Contact Info    Edgar Rhodes MD Internal Medicine Schedule an appointment as soon as possible for a visit in 2 days As needed, If symptoms worsen [address] [facility] [address]  [facility] [address]  [phone]               Korina Castillo PA-C  04/16/24 6957

## 2024-08-21 ENCOUNTER — HOSPITAL ENCOUNTER (EMERGENCY)
Facility: HOSPITAL | Age: 69
Discharge: HOME | End: 2024-08-21
Attending: EMERGENCY MEDICINE
Payer: MEDICARE

## 2024-08-21 VITALS
SYSTOLIC BLOOD PRESSURE: 189 MMHG | HEIGHT: 64 IN | BODY MASS INDEX: 29.88 KG/M2 | HEART RATE: 85 BPM | WEIGHT: 175 LBS | RESPIRATION RATE: 16 BRPM | OXYGEN SATURATION: 95 % | DIASTOLIC BLOOD PRESSURE: 90 MMHG | TEMPERATURE: 99 F

## 2024-08-21 DIAGNOSIS — M54.42 ACUTE LEFT-SIDED LOW BACK PAIN WITH LEFT-SIDED SCIATICA: Primary | ICD-10-CM

## 2024-08-21 LAB
BACTERIA #/AREA URNS HPF: NORMAL /HPF
BILIRUB UR QL STRIP: NEGATIVE
CLARITY UR: CLEAR
COLOR UR: YELLOW
GLUCOSE UR QL STRIP: ABNORMAL
HGB UR QL STRIP: NEGATIVE
HYALINE CASTS #/AREA URNS LPF: 0 /LPF
KETONES UR QL STRIP: NEGATIVE
LEUKOCYTE ESTERASE UR QL STRIP: NEGATIVE
MICROSCOPIC COMMENT: NORMAL
NITRITE UR QL STRIP: NEGATIVE
PH UR STRIP: 6 [PH] (ref 5–8)
PROT UR QL STRIP: ABNORMAL
RBC #/AREA URNS HPF: 1 /HPF (ref 0–4)
SP GR UR STRIP: 1.02 (ref 1–1.03)
SQUAMOUS #/AREA URNS HPF: 1 /HPF
URN SPEC COLLECT METH UR: ABNORMAL
UROBILINOGEN UR STRIP-ACNC: ABNORMAL EU/DL
WBC #/AREA URNS HPF: 1 /HPF (ref 0–5)
YEAST URNS QL MICRO: NORMAL

## 2024-08-21 PROCEDURE — 81000 URINALYSIS NONAUTO W/SCOPE: CPT

## 2024-08-21 PROCEDURE — 25000003 PHARM REV CODE 250

## 2024-08-21 PROCEDURE — 63600175 PHARM REV CODE 636 W HCPCS

## 2024-08-21 PROCEDURE — 99284 EMERGENCY DEPT VISIT MOD MDM: CPT | Mod: 25

## 2024-08-21 PROCEDURE — 96372 THER/PROPH/DIAG INJ SC/IM: CPT

## 2024-08-21 RX ORDER — LIDOCAINE 50 MG/G
1 PATCH TOPICAL
Status: DISCONTINUED | OUTPATIENT
Start: 2024-08-21 | End: 2024-08-21 | Stop reason: HOSPADM

## 2024-08-21 RX ORDER — MELOXICAM 15 MG/1
15 TABLET ORAL DAILY
Qty: 7 TABLET | Refills: 0 | Status: SHIPPED | OUTPATIENT
Start: 2024-08-21 | End: 2024-08-28

## 2024-08-21 RX ORDER — DEXAMETHASONE SODIUM PHOSPHATE 4 MG/ML
8 INJECTION, SOLUTION INTRA-ARTICULAR; INTRALESIONAL; INTRAMUSCULAR; INTRAVENOUS; SOFT TISSUE
Status: COMPLETED | OUTPATIENT
Start: 2024-08-21 | End: 2024-08-21

## 2024-08-21 RX ORDER — LIDOCAINE 50 MG/G
1 PATCH TOPICAL DAILY
Qty: 7 PATCH | Refills: 0 | Status: SHIPPED | OUTPATIENT
Start: 2024-08-21 | End: 2024-08-28

## 2024-08-21 RX ORDER — METHOCARBAMOL 500 MG/1
500 TABLET, FILM COATED ORAL
Status: COMPLETED | OUTPATIENT
Start: 2024-08-21 | End: 2024-08-21

## 2024-08-21 RX ORDER — METHOCARBAMOL 500 MG/1
500 TABLET, FILM COATED ORAL 4 TIMES DAILY
Qty: 40 TABLET | Refills: 0 | Status: SHIPPED | OUTPATIENT
Start: 2024-08-21 | End: 2024-08-31

## 2024-08-21 RX ORDER — KETOROLAC TROMETHAMINE 30 MG/ML
30 INJECTION, SOLUTION INTRAMUSCULAR; INTRAVENOUS
Status: COMPLETED | OUTPATIENT
Start: 2024-08-21 | End: 2024-08-21

## 2024-08-21 RX ADMIN — DEXAMETHASONE SODIUM PHOSPHATE 8 MG: 4 INJECTION, SOLUTION INTRA-ARTICULAR; INTRALESIONAL; INTRAMUSCULAR; INTRAVENOUS; SOFT TISSUE at 03:08

## 2024-08-21 RX ADMIN — METHOCARBAMOL 500 MG: 500 TABLET ORAL at 03:08

## 2024-08-21 RX ADMIN — KETOROLAC TROMETHAMINE 30 MG: 30 INJECTION, SOLUTION INTRAMUSCULAR; INTRAVENOUS at 03:08

## 2024-08-21 RX ADMIN — LIDOCAINE 1 PATCH: 50 PATCH CUTANEOUS at 03:08

## 2024-08-21 NOTE — ED TRIAGE NOTES
Chronic pain left hip and left leg x 1 month with no h/o injury. Denies taking OTC meds for pain control States pain increases with weight bearing and when getting up from sitting position. Denies urinary complaints. Presents awake, alert.

## 2024-08-21 NOTE — DISCHARGE INSTRUCTIONS
Today you were seen in the emergency for back pain/leg pain.  It was determined that your back pain is likely due to a muscular etiology.  I hope that you start to feel better soon.  However, please return to the emergency room if you start to experience new or worsening symptoms such as urinating on herself, weakness in your legs, numbness, or difficulty walking. Be sure to follow up with your primary care provider within the next week. I have also placed a referral to pain management and PCP for follow up care. [facility] will call you within 48 hours to make an appointment, or you can call [phone] to schedule.     I have sent in prescriptions for the following:   - meloxicam is an NSAID.  You received Toradol in the emergency room.  However, due to history of kidney disease, it is advised to not take NSAIDs frequently.  Therefore, only take meloxicam once daily.  If you are taking this medication, please do not use other NSAIDs such as ibuprofen, meloxicam, Celebrex, others. It is okay to use Tylenol.   - I have prescribed a muscle relaxer, Robaxin. You can take this 3 times per day, but it can make you sleepy. So I recommend only taking this at night. Please do note drive or operate heavy machinery while taking.    - Lidocaine patches: leave on for 12 hours, take off for 12 hours. Then apply a new patch.    See discharge paperwork for more information on your diagnosis and stretches/exercises you can do to alleviate the pain.

## 2024-08-21 NOTE — ED PROVIDER NOTES
Encounter Date: 8/21/2024       History     Chief Complaint   Patient presents with    Leg Pain     Patient presents to the ED complaining of left hip and leg pain x1 month. Denies trauma. Ambulatory to triage without assistance.      Patient is a 68-year-old female with a past medical history of diabetes mellitus type 2, hypertension, and hypothyroidism who presents to emergency room for left hip and leg pain over the past month.  It is intermittent.  Denies trauma or inciting event.  Patient states that it starts in her left lower back/hip area and radiates down her leg.  No numbness or tingling.  No weakness.  No bowel incontinence or bladder incontinence. Pain is exacerbated with certain movements.  Patient has been taking gabapentin for pain with some relief.     The history is provided by the patient. No  was used.     Review of patient's allergies indicates:   Allergen Reactions    Lisinopril Swelling     angioedema     Past Medical History:   Diagnosis Date    Diabetes mellitus, type 2     with neuropathy    Hypertension     Hypothyroidism      Past Surgical History:   Procedure Laterality Date    BREAST CYST EXCISION      CHOLECYSTECTOMY      HYSTERECTOMY      karen - not for cancer    THYROID SURGERY       Family History   Problem Relation Name Age of Onset    Cancer Mother          renal and colon    Stroke Mother      Heart disease Father      Diabetes Sister      Hypertension Sister      Hypertension Sister      Hyperlipidemia Sister      Hypertension Sister      Melanoma Neg Hx       Social History     Tobacco Use    Smoking status: Never     Passive exposure: Never    Smokeless tobacco: Never   Substance Use Topics    Alcohol use: No    Drug use: Never     Review of Systems   Constitutional:  Negative for chills, diaphoresis, fatigue and fever.   HENT:  Negative for congestion, sore throat and trouble swallowing.    Gastrointestinal:  Negative for abdominal pain, blood in stool,  constipation, diarrhea, nausea and vomiting.   Genitourinary:  Negative for difficulty urinating, dysuria, frequency and urgency.   Musculoskeletal:  Positive for arthralgias (left leg) and back pain (left lower). Negative for myalgias.   Skin:  Negative for rash and wound.   Neurological:  Negative for weakness, light-headedness, numbness and headaches.       Physical Exam     Initial Vitals [08/21/24 1440]   BP Pulse Resp Temp SpO2   (!) 189/90 85 16 98.6 °F (37 °C) 95 %      MAP       --         Physical Exam    Nursing note and vitals reviewed.  Constitutional: She appears well-developed and well-nourished. She is not diaphoretic. No distress.   Patient well-appearing.  Awake and alert.  No acute distress.  Maintaining airway appropriately.  Speaking in complete sentences.   HENT:   Head: Normocephalic and atraumatic.   Right Ear: External ear normal.   Left Ear: External ear normal.   Eyes: Conjunctivae and EOM are normal.   Neck: Neck supple.   Normal range of motion.  Pulmonary/Chest: No respiratory distress.   Musculoskeletal:         General: No edema.      Cervical back: Normal, normal range of motion and neck supple.      Thoracic back: Normal.      Lumbar back: Tenderness present. Normal range of motion. Positive left straight leg raise test. Negative right straight leg raise test.        Back:      Neurological: She is alert and oriented to person, place, and time. She has normal strength. GCS score is 15. GCS eye subscore is 4. GCS verbal subscore is 5. GCS motor subscore is 6.   Patient able to ambulate with steady gait.  Strength 5/5 in bilateral lower. Sensation intact throughout.  No cranial nerve deficits.   Skin: Skin is warm.   Psychiatric: She has a normal mood and affect. Her behavior is normal. Thought content normal.         ED Course   Procedures  Labs Reviewed   URINALYSIS, REFLEX TO URINE CULTURE - Abnormal       Result Value    Specimen UA Urine, Clean Catch      Color, UA Yellow       Appearance, UA Clear      pH, UA 6.0      Specific Gravity, UA 1.020      Protein, UA 1+ (*)     Glucose, UA 4+ (*)     Ketones, UA Negative      Bilirubin (UA) Negative      Occult Blood UA Negative      Nitrite, UA Negative      Urobilinogen, UA 2.0-3.0 (*)     Leukocytes, UA Negative      Narrative:     Specimen Source->Urine   URINALYSIS MICROSCOPIC    RBC, UA 1      WBC, UA 1      Bacteria Rare      Yeast, UA None      Squam Epithel, UA 1      Hyaline Casts, UA 0      Microscopic Comment SEE COMMENT      Narrative:     Specimen Source->Urine          Imaging Results    None          Medications   LIDOcaine 5 % patch 1 patch (1 patch Transdermal Patch Applied 8/21/24 1520)   ketorolac injection 30 mg (30 mg Intramuscular Given 8/21/24 1519)   methocarbamoL tablet 500 mg (500 mg Oral Given 8/21/24 1519)   dexAMETHasone injection 8 mg (8 mg Intramuscular Given 8/21/24 1520)     Medical Decision Making  Patient presents to emergency room for left lower back pain and leg pain.  Vital signs stable.  Physical exam as stated above.    Differential diagnosis includes but is not limited to muscle strain/sprain, slipped disc w/ radicular pain including sciatica, slipped disc w/ cauda equina syndrome, vertebral fracture, vertebral tumor, epidural abscess / discitis, or pyelonephritis.  I do not suspect fracture due to lack of inciting event or injury prior to onset of symptoms.  Patient without urinary retention, incontinence, or bilateral lower extremity weakness/numbness; therefore, I do not suspect cauda equina syndrome.  Emergent MRI not indicated at this time.  Patient clinically appears well and is afebrile without any recent procedures.  Unlikely abscess or discitis.  Clinical presentation and physical exam most suggestive of muscle strain versus sciatica. Patient given Toradol, Decadron, Robaxin, and lidocaine patch. Will prescribe Mobic, Robaxin, and lidocaine patch to use upon discharge.  Discussed conservative  management such as stretching and ice/heat to the area.  Referral placed to pain management.    I see no indication of an emergent process beyond that addressed during our encounter. Patient stable for discharge at this time. I have counseled the patient regarding follow up with PCP and gave strict return precautions. I have discussed the final diagnosis and gave instructions regarding prescribed and over-the-counter medications. Patient verbalized understanding and is agreeable.     Problems Addressed:  Acute left-sided low back pain with left-sided sciatica: acute illness or injury    Amount and/or Complexity of Data Reviewed  External Data Reviewed: labs and notes.     Details: Patient is seen in the emergency room in 04/2024 for right leg pain.  Patient states it feels similar to this.  Had ultrasound done at that time which was unremarkable.  Was diagnosed with sciatica.    In 05/2024, patient with slight increase in creatinine to 1.4 with slightly decreased GFR.  Will consider this when prescribing medications.    Labs:  Decision-making details documented in ED Course.  Radiology:      Details: Considered ordering CT or MRI.  However, patient without red flag symptoms that would suggest emergent causes of back pain.    Risk  OTC drugs.  Prescription drug management.  Risk Details: Comorbidities taken into consideration during the patient's evaluation and treatment include diabetes mellitus type 2, hypertension, and hyperthyroidism.    Social determinants of health taken into consideration during development of our treatment plan include difficulty in obtaining follow-up, obtaining medications, health literacy, access to healthy options for preventative/conservative management, and/or support systems due to, but not limited to, transportation limitations, socioeconomic status, and environmental factors.                ED Course as of 08/21/24 1656   Wed Aug 21, 2024   9173 Patient states that she feels much  better after medications.  Pending urinalysis. [BJ]   1620 Urinalysis, Reflex to Urine Culture Urine, Clean Catch(!)  Urinalysis without leukocytes.  No nitrites.  4+ glucose. [BJ]   1621 Urinalysis Microscopic  Microscopic urinalysis without increase in red or white blood cells.  Rare bacteria.  No yeast.  Notable squamous. [BJ]      ED Course User Index  [BJ] Yocasta Dee PA-C                           Clinical Impression:  Final diagnoses:  [M54.42] Acute left-sided low back pain with left-sided sciatica (Primary)          ED Disposition Condition    Discharge Stable          ED Prescriptions       Medication Sig Dispense Start Date End Date Auth. Provider    LIDOcaine (LIDODERM) 5 % Place 1 patch onto the skin once daily. Remove & Discard patch within 12 hours or as directed by MD for 7 days 7 patch 8/21/2024 8/28/2024 Yocasta Dee PA-C    methocarbamoL (ROBAXIN) 500 MG Tab Take 1 tablet (500 mg total) by mouth 4 (four) times daily. for 10 days 40 tablet 8/21/2024 8/31/2024 Yocasta Dee PA-C    meloxicam (MOBIC) 15 MG tablet Take 1 tablet (15 mg total) by mouth once daily. for 7 days 7 tablet 8/21/2024 8/28/2024 Yocasta Dee PA-C          Follow-up Information       Follow up With Specialties Details Why Contact Info Additional Information    Edgar Rhodes MD Internal Medicine   [address]  [facility] [address] [phone]       [facility]   [address]  [address]  [facility] [address] [phone] Please park in [address] or [address] and use [address]. Take Medical Office Bldg elevators. [address] Please check-in for all clinic appointments on the 1st floor, at the desk, in the [facility] lobby before coming up to the clinic for your appointment.            This note was partially created using [employer] Voice Recognition software. Typographical and content errors may occur with this process. While efforts are made to detect and correct such  errors, in some cases errors will persist. For this reason, wording in this document should be considered in the proper context and not strictly verbatim.        Yocasta Dee PA-C  08/21/24 0609

## 2024-11-13 ENCOUNTER — HOSPITAL ENCOUNTER (EMERGENCY)
Facility: HOSPITAL | Age: 69
Discharge: HOME | End: 2024-11-13
Attending: EMERGENCY MEDICINE
Payer: MEDICARE

## 2024-11-13 VITALS
RESPIRATION RATE: 13 BRPM | BODY MASS INDEX: 38.68 KG/M2 | HEIGHT: 60 IN | TEMPERATURE: 98 F | HEART RATE: 74 BPM | WEIGHT: 197 LBS | DIASTOLIC BLOOD PRESSURE: 97 MMHG | OXYGEN SATURATION: 100 % | SYSTOLIC BLOOD PRESSURE: 185 MMHG

## 2024-11-13 DIAGNOSIS — M79.671 FOOT PAIN, RIGHT: Primary | ICD-10-CM

## 2024-11-13 DIAGNOSIS — I10 HYPERTENSION: ICD-10-CM

## 2024-11-13 DIAGNOSIS — M79.671 RIGHT FOOT PAIN: ICD-10-CM

## 2024-11-13 LAB
ALBUMIN SERPL BCP-MCNC: 4.3 G/DL (ref 3.5–5.2)
ALP SERPL-CCNC: 108 U/L (ref 40–150)
ALT SERPL W/O P-5'-P-CCNC: 15 U/L (ref 10–44)
ANION GAP SERPL CALC-SCNC: 9 MMOL/L (ref 8–16)
AST SERPL-CCNC: 26 U/L (ref 10–40)
BASOPHILS # BLD AUTO: 0.05 K/UL (ref 0–0.2)
BASOPHILS NFR BLD: 0.9 % (ref 0–1.9)
BILIRUB SERPL-MCNC: 0.6 MG/DL (ref 0.1–1)
BNP SERPL-MCNC: 36 PG/ML (ref 0–99)
BUN SERPL-MCNC: 19 MG/DL (ref 8–23)
CALCIUM SERPL-MCNC: 10.2 MG/DL (ref 8.7–10.5)
CHLORIDE SERPL-SCNC: 107 MMOL/L (ref 95–110)
CO2 SERPL-SCNC: 26 MMOL/L (ref 23–29)
CREAT SERPL-MCNC: 1.3 MG/DL (ref 0.5–1.4)
DIFFERENTIAL METHOD BLD: ABNORMAL
EOSINOPHIL # BLD AUTO: 0.2 K/UL (ref 0–0.5)
EOSINOPHIL NFR BLD: 4 % (ref 0–8)
ERYTHROCYTE [DISTWIDTH] IN BLOOD BY AUTOMATED COUNT: 13.6 % (ref 11.5–14.5)
EST. GFR  (NO RACE VARIABLE): 44 ML/MIN/1.73 M^2
GLUCOSE SERPL-MCNC: 102 MG/DL (ref 70–110)
HCT VFR BLD AUTO: 37.9 % (ref 37–48.5)
HCV AB SERPL QL IA: NORMAL
HGB BLD-MCNC: 12 G/DL (ref 12–16)
IMM GRANULOCYTES # BLD AUTO: 0.04 K/UL (ref 0–0.04)
IMM GRANULOCYTES NFR BLD AUTO: 0.8 % (ref 0–0.5)
LACTATE SERPL-SCNC: 1.2 MMOL/L (ref 0.5–2.2)
LYMPHOCYTES # BLD AUTO: 1.6 K/UL (ref 1–4.8)
LYMPHOCYTES NFR BLD: 30.8 % (ref 18–48)
MCH RBC QN AUTO: 28.2 PG (ref 27–31)
MCHC RBC AUTO-ENTMCNC: 31.7 G/DL (ref 32–36)
MCV RBC AUTO: 89 FL (ref 82–98)
MONOCYTES # BLD AUTO: 0.5 K/UL (ref 0.3–1)
MONOCYTES NFR BLD: 8.5 % (ref 4–15)
NEUTROPHILS # BLD AUTO: 2.9 K/UL (ref 1.8–7.7)
NEUTROPHILS NFR BLD: 55 % (ref 38–73)
NRBC BLD-RTO: 0 /100 WBC
OHS QRS DURATION: 82 MS
OHS QRS DURATION: 84 MS
OHS QTC CALCULATION: 407 MS
OHS QTC CALCULATION: 451 MS
PLATELET # BLD AUTO: 249 K/UL (ref 150–450)
PMV BLD AUTO: 10.4 FL (ref 9.2–12.9)
POTASSIUM SERPL-SCNC: 3.4 MMOL/L (ref 3.5–5.1)
PROT SERPL-MCNC: 8.8 G/DL (ref 6–8.4)
RBC # BLD AUTO: 4.26 M/UL (ref 4–5.4)
SODIUM SERPL-SCNC: 142 MMOL/L (ref 136–145)
WBC # BLD AUTO: 5.3 K/UL (ref 3.9–12.7)

## 2024-11-13 PROCEDURE — 85025 COMPLETE CBC W/AUTO DIFF WBC: CPT | Performed by: PHYSICIAN ASSISTANT

## 2024-11-13 PROCEDURE — 80053 COMPREHEN METABOLIC PANEL: CPT | Performed by: PHYSICIAN ASSISTANT

## 2024-11-13 PROCEDURE — 83605 ASSAY OF LACTIC ACID: CPT | Performed by: PHYSICIAN ASSISTANT

## 2024-11-13 PROCEDURE — 86803 HEPATITIS C AB TEST: CPT | Performed by: PHYSICIAN ASSISTANT

## 2024-11-13 PROCEDURE — 25000003 PHARM REV CODE 250: Performed by: EMERGENCY MEDICINE

## 2024-11-13 PROCEDURE — 93005 ELECTROCARDIOGRAM TRACING: CPT

## 2024-11-13 PROCEDURE — 93010 ELECTROCARDIOGRAM REPORT: CPT | Mod: ,,, | Performed by: INTERNAL MEDICINE

## 2024-11-13 PROCEDURE — 99285 EMERGENCY DEPT VISIT HI MDM: CPT | Mod: 25

## 2024-11-13 PROCEDURE — 93010 ELECTROCARDIOGRAM REPORT: CPT | Mod: 76,,, | Performed by: INTERNAL MEDICINE

## 2024-11-13 PROCEDURE — 83880 ASSAY OF NATRIURETIC PEPTIDE: CPT | Performed by: EMERGENCY MEDICINE

## 2024-11-13 PROCEDURE — 87040 BLOOD CULTURE FOR BACTERIA: CPT | Performed by: PHYSICIAN ASSISTANT

## 2024-11-13 RX ORDER — LOSARTAN POTASSIUM 50 MG/1
50 TABLET ORAL
Status: DISCONTINUED | OUTPATIENT
Start: 2024-11-13 | End: 2024-11-13

## 2024-11-13 RX ORDER — AMLODIPINE BESYLATE 10 MG/1
10 TABLET ORAL
Status: COMPLETED | OUTPATIENT
Start: 2024-11-13 | End: 2024-11-13

## 2024-11-13 RX ORDER — LOSARTAN POTASSIUM 50 MG/1
100 TABLET ORAL
Status: COMPLETED | OUTPATIENT
Start: 2024-11-13 | End: 2024-11-13

## 2024-11-13 RX ADMIN — LOSARTAN POTASSIUM 100 MG: 50 TABLET, FILM COATED ORAL at 04:11

## 2024-11-13 RX ADMIN — AMLODIPINE BESYLATE 10 MG: 10 TABLET ORAL at 04:11

## 2024-11-13 NOTE — FIRST PROVIDER EVALUATION
" Emergency Department TeleTriage Encounter Note      CHIEF COMPLAINT    Chief Complaint   Patient presents with    Foot Pain     Right foot pain and swelling; seen at  and advised to come to ED for further workup. Pt states pain and swelling does go up right leg. Pt states "concern for blockage". Denies chest pain. Denies SOB.     Hypertension       VITAL SIGNS   Initial Vitals [11/13/24 1307]   BP Pulse Resp Temp SpO2   (!) 235/114 85 20 97.9 °F (36.6 °C) 99 %      MAP       --            ALLERGIES    Review of patient's allergies indicates:   Allergen Reactions    Lisinopril Swelling     angioedema       PROVIDER TRIAGE NOTE  Patient presents with swelling, redness, pain to the right foot for months. She has DM, but has not been checking sugar.       ORDERS  Labs Reviewed   HEPATITIS C ANTIBODY       ED Orders (720h ago, onward)      Start Ordered     Status Ordering Provider    11/13/24 1310 11/13/24 1309  Hepatitis C Antibody  STAT         Acknowledged SARABJIT CROSS    11/13/24 1310 11/13/24 1310  EKG 12-lead  Once         Completed by ORMOND, ALEXANDER on 11/13/2024 at  1:37 PM DEYVI PATEL              Virtual Visit Note: The provider triage portion of this emergency department evaluation and documentation was performed via [employer], a HIPAA-compliant telemedicine application, in concert with a tele-presenter in the room. A face to face patient evaluation with one of my colleagues will occur once the patient is placed in an emergency department room.      DISCLAIMER: This note was prepared with [employer] voice recognition transcription software. Garbled syntax, mangled pronouns, and other bizarre constructions may be attributed to that software system.    "

## 2024-11-13 NOTE — ED TRIAGE NOTES
"Marco Hagen, a 69 y.o. female presents to the ED w/ complaint of right foot pain and swelling. Reports hypertension.     Triage note:  Chief Complaint   Patient presents with    Foot Pain     Right foot pain and swelling; seen at  and advised to come to ED for further workup. Pt states pain and swelling does go up right leg. Pt states "concern for blockage". Denies chest pain. Denies SOB.     Hypertension     Review of patient's allergies indicates:   Allergen Reactions    Lisinopril Swelling     angioedema     Past Medical History:   Diagnosis Date    Diabetes mellitus, type 2     with neuropathy    Hypertension     Hypothyroidism       "

## 2024-11-13 NOTE — ED PROVIDER NOTES
"Encounter Date: 11/13/2024       History     Chief Complaint   Patient presents with    Foot Pain     Right foot pain and swelling; seen at  and advised to come to ED for further workup. Pt states pain and swelling does go up right leg. Pt states "concern for blockage". Denies chest pain. Denies SOB.     Hypertension     69-year-old female history of hypertension, diabetes, hypothyroidism, CKD, presenting today with right foot pain.  Patient apparently went to an urgent care earlier today for an evaluation of this foot pain and was told to come to the ED.  It was not entirely clear as to why based on their paperwork it just says leg edema, go to the ED.    Patient has been having pain in her right foot for months.  She denies any history of trauma or falls.  She does not have a history of gout.  The pain is usually localized to her midfoot.  She reports that she is not actually having any pain right now while in the ED. in terms of her lower extremity edema, she thinks that her bilateral lower extremity edema is new.  She is not having any shortness of breath, orthopnea or PND.    Her daughter is on the phone, she recently visited her [place], and she reports that she was complaining of right foot pain during her visit.  No fevers, chills.    Patient has a history of hypertension in his supposed to be on losartan 100 mg q.d. and amlodipine 10 mg q.d..  She has not taken either of her medications today.    The history is provided by the patient.     Review of patient's allergies indicates:   Allergen Reactions    Lisinopril Swelling     angioedema     Past Medical History:   Diagnosis Date    Diabetes mellitus, type 2     with neuropathy    Hypertension     Hypothyroidism      Past Surgical History:   Procedure Laterality Date    BREAST CYST EXCISION      CHOLECYSTECTOMY      HYSTERECTOMY      karen - not for cancer    THYROID SURGERY       Family History   Problem Relation Name Age of Onset    Cancer Mother          " renal and colon    Stroke Mother      Heart disease Father      Diabetes Sister      Hypertension Sister      Hypertension Sister      Hyperlipidemia Sister      Hypertension Sister      Melanoma Neg Hx       Social History     Tobacco Use    Smoking status: Never     Passive exposure: Never    Smokeless tobacco: Never   Substance Use Topics    Alcohol use: No    Drug use: Never     Review of Systems    Physical Exam     Initial Vitals [11/13/24 1307]   BP Pulse Resp Temp SpO2   (!) 235/114 85 20 97.9 °F (36.6 °C) 99 %      MAP       --         Physical Exam    Nursing note and vitals reviewed.  Constitutional: Vital signs are normal. She appears well-developed and well-nourished. She is not diaphoretic.  Non-toxic appearance. She does not appear ill. No distress.   HENT:   Head: Normocephalic and atraumatic. Mouth/Throat: Mucous membranes are normal. Mucous membranes are not dry.   Eyes: Conjunctivae and lids are normal.   Neck: Neck supple.   Normal range of motion.  Cardiovascular:  Normal rate.           Pulmonary/Chest: No respiratory distress.   Musculoskeletal:         General: Edema present.      Cervical back: Normal range of motion and neck supple.      Comments: LE: 1-2+ edema to bilateral lower extremities    Right foot:  Mild swelling noted to the dorsal aspect of the right foot.  No area of erythema, induration, drainage.  No wounds.  I can not reproduce tenderness with palpation to the right foot.    Dorsalis pedis pulse is 2 +bilaterally     Neurological: She is alert and oriented to person, place, and time.   Skin: Skin is dry and intact. No pallor.   Psychiatric: She has a normal mood and affect. Her speech is normal and behavior is normal.         ED Course   Procedures  Labs Reviewed   CBC W/ AUTO DIFFERENTIAL - Abnormal       Result Value    WBC 5.30      RBC 4.26      Hemoglobin 12.0      Hematocrit 37.9      MCV 89      MCH 28.2      MCHC 31.7 (*)     RDW 13.6      Platelets 249      MPV 10.4       Immature Granulocytes 0.8 (*)     Gran # (ANC) 2.9      Immature Grans (Abs) 0.04      Lymph # 1.6      Mono # 0.5      Eos # 0.2      Baso # 0.05      nRBC 0      Gran % 55.0      Lymph % 30.8      Mono % 8.5      Eosinophil % 4.0      Basophil % 0.9      Differential Method Automated     COMPREHENSIVE METABOLIC PANEL - Abnormal    Sodium 142      Potassium 3.4 (*)     Chloride 107      CO2 26      Glucose 102      BUN 19      Creatinine 1.3      Calcium 10.2      Total Protein 8.8 (*)     Albumin 4.3      Total Bilirubin 0.6      Alkaline Phosphatase 108      AST 26      ALT 15      eGFR 44.0 (*)     Anion Gap 9     CULTURE, BLOOD   CULTURE, BLOOD   HEPATITIS C ANTIBODY    Hepatitis C Ab Non-reactive      Narrative:     Release to patient->Immediate   LACTIC ACID, PLASMA    Lactate (Lactic Acid) 1.2     B-TYPE NATRIURETIC PEPTIDE    BNP 36          ECG Results              EKG 12-lead (Final result)        Collection Time Result Time QRS Duration OHS QTC Calculation    11/13/24 15:04:19 11/13/24 16:43:26 82 451                     Final result by Interface, Lab In [facility] (11/13/24 16:43:32)                   Narrative:    Test Reason : I10,    Vent. Rate :  78 BPM     Atrial Rate :  78 BPM     P-R Int : 180 ms          QRS Dur :  82 ms      QT Int : 396 ms       P-R-T Axes :  48 -15  49 degrees    QTcB Int : 451 ms    Normal sinus rhythm  Low voltage, precordial leads  Nonspecific T wave abnormality  Abnormal ECG  When compared with ECG of 13-Nov-2024 13:14,  Premature atrial complexes are no longer Present  Confirmed by Tripp Burgos ([id number]) on 11/13/2024 4:43:25 PM    Referred By: AAAREFERRAL SELF           Confirmed By: Tripp Burgos                                     EKG 12-lead (Final result)        Collection Time Result Time QRS Duration OHS QTC Calculation    11/13/24 13:14:36 11/13/24 16:42:51 84 407                     Final result by Interface, Lab In [facility] (11/13/24 16:42:56)                    Narrative:    Test Reason : I10,    Vent. Rate :  77 BPM     Atrial Rate :  77 BPM     P-R Int : 182 ms          QRS Dur :  84 ms      QT Int : 360 ms       P-R-T Axes :  30 -19  31 degrees    QTcB Int : 407 ms    Sinus rhythm with Premature atrial complexes  Nonspecific T wave abnormality Now present  Abnormal ECG  When compared with ECG of 04-Jun-2023 09:03,    Confirmed by Tripp Burgos ([id number]) on 11/13/2024 4:42:47 PM    Referred By:            Confirmed By: Tripp Burgos                                  Imaging Results              X-Ray Chest AP Portable (Final result)  Result time 11/13/24 15:33:55      Final result by Benjamin Pratt MD (11/13/24 15:33:55)                   Impression:      No acute abnormality.      Electronically signed by: Benjamin Pratt MD  Date:    11/13/2024  Time:    15:33               Narrative:    EXAMINATION:  XR CHEST AP PORTABLE    CLINICAL HISTORY:  leg edema;    TECHNIQUE:  Single views of the chest were performed.    COMPARISON:  Chest radiograph dated August 4, 2023    FINDINGS:  The trachea and cardiomediastinal silhouette remains stable.  There is no evidence of pleural effusions, pneumothoraces or consolidations.  Lungs are clear.  Osseous structures demonstrate no evidence for acute fractures or dislocations.                                       X-Ray Foot Complete Right (Final result)  Result time 11/13/24 15:07:37      Final result by Priyank Gallego MD (11/13/24 15:07:37)                   Impression:      See above      Electronically signed by: Priyank Gallego MD  Date:    11/13/2024  Time:    15:07               Narrative:    EXAMINATION:  XR FOOT COMPLETE 3 VIEW RIGHT    CLINICAL HISTORY:  . Pain in right foot    TECHNIQUE:  AP, lateral, and oblique views of the right foot were performed.    COMPARISON:  None    FINDINGS:  Mild DJD and osteopenia.  Mild hammertoes.  No fracture or dislocation.  No bone destruction identified                                        Medications   amLODIPine tablet 10 mg (10 mg Oral Given 11/13/24 1610)   losartan tablet 100 mg (100 mg Oral Given 11/13/24 1610)     Medical Decision Making  Patient here with right foot pain for several months, gradually worsening.  No infectious symptoms reported no history of trauma.    On exam she is afebrile well-appearing clinically.  Right foot with very mild swelling noted but foot is well-perfused with strong dorsalis pedis pulses, no erythema or warmth, no deformity, no wounds.  Patient does have some swelling in the feet and legs but these are symmetric.    Differential includes arthritis, gout being the most likely, less likely infectious process, subacute fracture    In terms of her bilateral lower extremity edema, differential for this would include CHF, renal failure, liver failure, venous stasis disease, less likely DVT given the swelling symmetric    Plan  -labs including CBC, CMP, BNP  -chest x-ray and right foot x-ray  -dispo uncertain pending ED workup    Amount and/or Complexity of Data Reviewed  External Data Reviewed: notes.  Labs: ordered. Decision-making details documented in ED Course.  Radiology: ordered and independent interpretation performed. Decision-making details documented in ED Course.  ECG/medicine tests: ordered and independent interpretation performed. Decision-making details documented in ED Course.    Risk  Prescription drug management.  Decision regarding hospitalization.               ED Course as of 11/13/24 2159 Wed Nov 13, 2024   1518 Lactic Acid Level: 1.2 [AS]   1518 Creatinine: 1.3 [AS]   1555 BNP: 36 [AS]   1555 Lactic Acid Level: 1.2 [AS]   1555 Labs reviewed, all unremarkable including no leukocytosis, negative BNP, creatinine at baseline..  X-ray does not show any fractures and on chest x-ray there was no signs of pulmonary edema.  Patient's BP was markedly elevated on arrival but she had not taken her antihypertensive medications today.  I have given her  these medications and her blood pressure has improved significantly.  I ambulated the patient and she was able to ambulate well without any difficulty.  Unclear etiology of this foot pain but there was no signs of infection, ischemia, fracture at this point in time so I do think it is safe for her to be discharged home with outpatient podiatry follow up. [AS]      ED Course User Index  [AS] Eboni Diaz MD                             Clinical Impression:  Final diagnoses:  [I10] Hypertension  [M79.671] Foot pain, right (Primary)          ED Disposition Condition    Discharge Stable          ED Prescriptions    None       Follow-up Information       Follow up With Specialties Details Why Contact Info    Edgar Rhodes MD Internal Medicine Call today  [address] [facility], [facility] [address]  [employer] [facility] [id number] [phone]               Eboni Diaz MD  11/13/24 1671

## 2024-11-18 LAB
BACTERIA BLD CULT: NORMAL
BACTERIA BLD CULT: NORMAL